# Patient Record
Sex: FEMALE | Race: WHITE | NOT HISPANIC OR LATINO | Employment: FULL TIME | URBAN - METROPOLITAN AREA
[De-identification: names, ages, dates, MRNs, and addresses within clinical notes are randomized per-mention and may not be internally consistent; named-entity substitution may affect disease eponyms.]

---

## 2021-08-11 ENCOUNTER — OFFICE VISIT (OUTPATIENT)
Dept: ORTHOPEDIC SURGERY | Facility: CLINIC | Age: 56
End: 2021-08-11

## 2021-08-11 VITALS
DIASTOLIC BLOOD PRESSURE: 95 MMHG | WEIGHT: 264 LBS | BODY MASS INDEX: 40.01 KG/M2 | SYSTOLIC BLOOD PRESSURE: 155 MMHG | HEIGHT: 68 IN

## 2021-08-11 DIAGNOSIS — M79.672 LEFT FOOT PAIN: Primary | ICD-10-CM

## 2021-08-11 DIAGNOSIS — E66.01 OBESITY, MORBID, BMI 40.0-49.9 (HCC): ICD-10-CM

## 2021-08-11 DIAGNOSIS — G62.9 NEUROPATHY: ICD-10-CM

## 2021-08-11 DIAGNOSIS — F17.200 SMOKING: ICD-10-CM

## 2021-08-11 DIAGNOSIS — L92.0 GRANULOMA ANNULARE: ICD-10-CM

## 2021-08-11 DIAGNOSIS — R29.898 WEAKNESS OF LEFT LOWER EXTREMITY: ICD-10-CM

## 2021-08-11 PROCEDURE — 99204 OFFICE O/P NEW MOD 45 MIN: CPT | Performed by: ORTHOPAEDIC SURGERY

## 2021-08-11 RX ORDER — FAMOTIDINE 20 MG/1
20 TABLET, FILM COATED ORAL NIGHTLY PRN
COMMUNITY
Start: 2021-05-10

## 2021-08-11 RX ORDER — PREGABALIN 75 MG/1
75 CAPSULE ORAL 2 TIMES DAILY
COMMUNITY
Start: 2021-07-27

## 2021-08-11 RX ORDER — IBUPROFEN 600 MG/1
600 TABLET ORAL 2 TIMES DAILY PRN
COMMUNITY
Start: 2021-06-11

## 2021-08-11 RX ORDER — TIZANIDINE 4 MG/1
4 TABLET ORAL 3 TIMES DAILY PRN
COMMUNITY

## 2021-08-11 RX ORDER — AMITRIPTYLINE HYDROCHLORIDE 10 MG/1
10 TABLET, FILM COATED ORAL NIGHTLY
COMMUNITY
Start: 2021-06-11

## 2021-08-11 RX ORDER — AMLODIPINE BESYLATE 10 MG/1
10 TABLET ORAL EVERY MORNING
COMMUNITY
Start: 2021-05-22

## 2021-08-11 RX ORDER — DIPHENHYDRAMINE HCL 25 MG
25 CAPSULE ORAL NIGHTLY
COMMUNITY

## 2021-08-11 RX ORDER — LEVOTHYROXINE SODIUM 0.07 MG/1
75 TABLET ORAL DAILY
COMMUNITY

## 2021-08-11 RX ORDER — MULTIPLE VITAMINS W/ MINERALS TAB 9MG-400MCG
1 TAB ORAL DAILY
COMMUNITY

## 2021-08-11 RX ORDER — FUROSEMIDE 20 MG/1
20 TABLET ORAL AS NEEDED
COMMUNITY
Start: 2021-06-11

## 2021-08-11 RX ORDER — POTASSIUM CHLORIDE 1500 MG/1
20 TABLET, FILM COATED, EXTENDED RELEASE ORAL DAILY
COMMUNITY

## 2021-08-11 RX ORDER — LISINOPRIL 10 MG/1
10 TABLET ORAL 2 TIMES DAILY
COMMUNITY
Start: 2021-05-22

## 2021-08-11 RX ORDER — CHOLECALCIFEROL (VITAMIN D3) 1250 MCG
1 CAPSULE ORAL WEEKLY
COMMUNITY

## 2021-08-11 RX ORDER — CETIRIZINE HYDROCHLORIDE 10 MG/1
10 TABLET ORAL NIGHTLY
COMMUNITY

## 2021-08-11 RX ORDER — ACETAMINOPHEN 500 MG
1000 TABLET ORAL NIGHTLY
COMMUNITY

## 2021-08-11 NOTE — PROGRESS NOTES
"NEW PATIENT    Patient: Ciera Patton  : 1965    Primary Care Provider: Umang Headley MD    Requesting Provider: As above    Pain of the Right Ankle      History    Chief Complaint: Left leg problem    History of Present Illness: This is a pleasant 55-year-old woman here today with her mother.  She has a complicated musculoskeletal history.  In  she had an L1 burst fracture treated with surgery.  She went on to have some chronic pain and further problems and last year had an L3 to the sacrum fusion.  This was complicated by what she calls neuropathy.  She has numbness in both legs and feet.  After the initial burst fracture she had weakness in the left leg.  She has had many types of AFO braces over the years including all polyethylene, and double upright metal braces.  She reports she has had difficulty with all of them.  She has developed sores on the lateral aspect of the foot and ankle from rubbing in the brace.  She also reports that she had pain from the double upright brace.  She had nerve studies done about 2 years ago in [place], we do not have those records.  She is going to obtain those results and send them to me.  She remembers being told she had some type of neuropathy\" eventually she might not be able to walk at all\".  But again we have no documentation.  She is here with a question as to whether an ankle fusion would help stabilize her ankle.  She reports the ankle turns over all the time, she has pain, she has difficulty walking.  She rates the pain as 7-8 out of 10.  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  She also is a smoker.  I strongly recommend quitting smoking.  I explained the risks of smoking, including hardening of the arteries, gangrene, amputation.  I explained nicotine poisons bone cells and increases the risk of nonunion of fractures and fusions.  I explained that studies show that smokers have FOUR times the risk of the general population when " they have foot or ankle surgery.  I explained that I will not do elective surgery on smokers.  I explained that I test patients for nicotine a week before surgery and if they are positive surgery is canceled.  I explained that she could see any of the other orthopedic foot and ankle specialist in [facility] for another opinion, they might be willing to do it if she continues to smoke.  She reports that she will try to quit.  Her mother reports that she has quit previously.  (5min)      Current Outpatient Medications on File Prior to Visit   Medication Sig Dispense Refill   • acetaminophen (TYLENOL) 500 MG tablet Take 500 mg by mouth Every Night.     • cetirizine (zyrTEC) 10 MG tablet Take 10 mg by mouth Every Night.     • Cholecalciferol (VITAMIN D3 PO) Take  by mouth 1 (One) Time Per Week.     • diphenhydrAMINE (BENADRYL) 25 mg capsule Take 25 mg by mouth Every Night.     • levothyroxine (SYNTHROID, LEVOTHROID) 75 MCG tablet Take 75 mcg by mouth Daily. 1/2 tab extra on Sundays     • multivitamin with minerals (Womens 50+ Multi Vitamin/Min) tablet tablet Take 1 tablet by mouth Daily.     • POTASSIUM CHLORIDE ER PO Take  by mouth Daily.     • tiZANidine (ZANAFLEX) 4 MG tablet Take 4 mg by mouth 3 (Three) Times a Day.     • amitriptyline (ELAVIL) 10 MG tablet      • amLODIPine (NORVASC) 10 MG tablet Take 10 mg by mouth Daily.     • famotidine (PEPCID) 20 MG tablet      • furosemide (LASIX) 20 MG tablet      • ibuprofen (ADVIL,MOTRIN) 600 MG tablet      • lisinopril (PRINIVIL,ZESTRIL) 10 MG tablet Take 10 mg by mouth 2 (Two) Times a Day.     • pregabalin (LYRICA) 75 MG capsule        No current facility-administered medications on file prior to visit.      Allergies   Allergen Reactions   • Morphine Itching   • Statins Other (See Comments)     Rhabdomyolsis   • Flagyl [Metronidazole] Itching and Rash   • Wellbutrin [Bupropion] Itching and Rash      Past Medical History:   Diagnosis Date   • Arthritis    • Back problem    •  "Hypertension    • Hypoglycemia    • Thyroid disease     Hypo     Past Surgical History:   Procedure Laterality Date   • BACK SURGERY      fusion  L1  1993   L3 2018   •  SECTION  1997    Emergency   • COLONOSCOPY      2019   • D & C AND LAPAROSCOPY      2014   • WRIST SURGERY Left     Gangion cyst excision     Family History   Problem Relation Age of Onset   • Arthritis Mother    • Cancer Mother    • Diabetes Mother    • Diabetes Father       Social History     Socioeconomic History   • Marital status:      Spouse name: Not on file   • Number of children: Not on file   • Years of education: Not on file   • Highest education level: Not on file   Tobacco Use   • Smoking status: Current Every Day Smoker     Packs/day: 0.50     Years: 36.00     Pack years: 18.00     Types: Cigarettes   • Smokeless tobacco: Never Used   Substance and Sexual Activity   • Drug use: Never   • Sexual activity: Defer        Review of Systems   Constitutional: Positive for activity change.   HENT: Negative.    Eyes: Negative.    Respiratory: Negative.    Cardiovascular: Positive for leg swelling.   Gastrointestinal: Negative.    Endocrine: Positive for heat intolerance.   Genitourinary: Negative.    Musculoskeletal: Positive for arthralgias, back pain and joint swelling.   Skin: Negative.    Allergic/Immunologic: Positive for environmental allergies and food allergies.   Neurological: Negative.    Hematological: Negative.    Psychiatric/Behavioral: Negative.        The following portions of the patient's history were reviewed and updated as appropriate: allergies, current medications, past family history, past medical history, past social history, past surgical history and problem list.    Physical Exam:   /95   Ht 171.5 cm (67.5\")   Wt 120 kg (264 lb)   BMI 40.74 kg/m²   GENERAL: Body habitus: morbidly obese    Lower extremity edema: Right: trace; Left: trace    Varicose veins:  Right: none; Left: " none    Gait: antalgic and Moderate steppage gait on the left, she walks on the lateral border of the left foot, there is function of the anterior tib but no function of the peroneals     Mental Status:  awake and alert; oriented to person, place, and time    Voice:  clear  SKIN:  Lower extremity: Normal    Hair Growth(lower extremity):  Right:normal; Left:  normal  NAILS: Toenails: normal  HEENT: Head: Normocephalic, atraumatic,  without obvious abnormality.  eye: normal external eye, no icterus  ears:normal external ears  PULM:  Repiratory effort normal  CV:  Dorsalis Pedis:  Right: 2+; Left:2+    Posterior Tibial: Right:2+; Left:2+    Capillary Refill:  Brisk  MSK:  Hand:right handed and Granuloma annulare on both hands      Tibia:  Right:  non tender; Left:  non tender      Ankle:  Right: non tender, ROM  normal and motor function  normal; Left:  Nontender in the ankle itself, significant hindfoot varus in stance and at rest.  Cavovarus of the foot.  The Achilles is tight, she has -10 degrees of plantarflexion with the knee flexed and extended, the hindfoot varus is almost correctable to neutral.  She has a thickened callus and bursa on the fifth metatarsal base laterally because she is walking on the lateral border of the foot.  Moderately tender to palpation.  No open wounds.      Foot:  Right:  non tender; Left:  See ankle exam      NEURO: Heel Walking:  Right:  unable to test; Left:  unable to test    Toe Walking:  Right:  unable to test; Left:  unable to test     Fremont-Tavon 5.07 monofilament test: No sensation to the [place] Tavon fiber on the left foot, patchy on the right both dorsal and plantar    Lower extremity sensation: diminished     Reflexes:  Biceps:  Right:  not tested; Left:  not tested           Quads:  Right:  not tested; Left:  not tested           Ankle:  Right:  not tested; Left:  not tested      Calf Atrophy:Moderate left calf atrophy    Motor Function: Right lower extremity motor  function is 5 out of 5, no spasticity that I can identify.  Left lower extremity I do not find any obvious spasticity, no clonus, the anterior tib is 5 out of 5, posterior tib 5 out of 5, EHL 4 out of 5, EDL 4 out of 5, gastroc 5 out of 5, peroneals 0 out of 5, FHL 5 out of 5         Medical Decision Making    Data Review:   ordered and reviewed x-rays today, I reviewed records from her internal medicine physician.  We are going to obtain the prior nerve studies.    Assessment and Plan/ Diagnosis/Treatment options:   1. Left foot pain  Weakness in the left lower extremity especially the peroneals and EHL EDL.  We talked about the options.  I think an ankle fusion would be too aggressive.  Her ankle cartilage is preserved on the x-ray and the ankle does not have any tilt in the mortise.  Even with an ankle fusion she would still need bracing for the varus hindfoot.  Although she did not bring her braces, by description she has tried many different types of AFOs including a double upright.  For surgery I think it would be reasonable to try osteotomies and tendon transfers.  I explained that this would absolutely not make her normal, and she would still need a brace, but with the foot more centered under the tibia and in better alignment, the brace wear should be much improved.  If it failed then a tibial talocalcaneal fusion might be possible.  If it came to surgery I would do a posterior tibial tendon transfer through the interosseous membrane to the lateral foot, I would do a peroneus longus to brevis tenodesis, and Achilles tendon lengthening, possibly toe tendon lengthenings if needed.  I would do a dorsiflexion osteotomy of the first metatarsal.  I would do a sliding osteotomy of the calcaneus.  This is major surgery with a long recovery.  As noted above I am not willing to do it unless she quit smoking.  Before we make any type of surgical decision we need to repeat the EMG/NCV's.  I would like Dr. Houston Diehl  to do this.  I would also like her to bring the previous studies for comparison.  She is going to think about her options and work on quitting smoking.  We will order the nerve test.  As above I also gave her the names of several other orthopedic foot and ankle surgeons in the area.  - XR Ankle 2 View Left  - XR Foot 3+ View Left    2. Weakness of left lower extremity  As above  - EMG & Nerve Conduction Test; Future    3. Neuropathy  Fairly dense sensory component to her neurologic problem    4. Smoking  As above she is going to work on quitting    5. Granuloma annulare  Present on both hands    6. Obesity, morbid, BMI 40.0-49.9 (CMS/[facility])  BMI adds complications risk, her BMI is 40.74      Addendum 8/18/2021: We were able to obtain the EMGs/NCV's dated 2/20/2018. I reviewed them. They are abnormal on the left but nonspecific. They indicate abnormal peroneal and posterior tibial latencies, the diagnosis of the physician that did them was that she had RSD. This does not make sense in the face of the foot drop. Their note indicates they did not see a foot drop but clearly she has significant weakness in motor groups. I do not think that she has RSD. We will proceed with the repeat EMG/NCV's for surgical planning.      Radiology Ordered []  Radiology Reports Reviewed []      Radiology Images Reviewed []   Labs Reviewed []    Labs Ordered []   PCP Records Reviewed []    Provider Records Reviewed []    ER Records Reviewed []    Hospital Records Reviewed []    History Obtained From Family []    Phone conversation with Provider []    Records Requested []        Keesha Degroot MD

## 2021-09-17 ENCOUNTER — HOSPITAL ENCOUNTER (OUTPATIENT)
Dept: NEUROLOGY | Facility: HOSPITAL | Age: 56
Discharge: HOME | End: 2021-09-17
Admitting: ORTHOPAEDIC SURGERY

## 2021-09-17 DIAGNOSIS — R29.898 WEAKNESS OF LEFT LOWER EXTREMITY: ICD-10-CM

## 2021-09-17 PROCEDURE — 95910 NRV CNDJ TEST 7-8 STUDIES: CPT

## 2021-09-17 PROCEDURE — 95886 MUSC TEST DONE W/N TEST COMP: CPT

## 2021-09-22 ENCOUNTER — OFFICE VISIT (OUTPATIENT)
Dept: ORTHOPEDIC SURGERY | Facility: CLINIC | Age: 56
End: 2021-09-22

## 2021-09-22 VITALS
WEIGHT: 270 LBS | DIASTOLIC BLOOD PRESSURE: 91 MMHG | HEART RATE: 90 BPM | BODY MASS INDEX: 40.92 KG/M2 | SYSTOLIC BLOOD PRESSURE: 183 MMHG | HEIGHT: 68 IN

## 2021-09-22 DIAGNOSIS — R29.898 WEAKNESS OF LEFT LOWER EXTREMITY: Primary | ICD-10-CM

## 2021-09-22 DIAGNOSIS — E66.01 OBESITY, MORBID, BMI 40.0-49.9 (HCC): ICD-10-CM

## 2021-09-22 DIAGNOSIS — F17.200 SMOKING: ICD-10-CM

## 2021-09-22 DIAGNOSIS — L92.0 GRANULOMA ANNULARE: ICD-10-CM

## 2021-09-22 DIAGNOSIS — G62.9 NEUROPATHY: ICD-10-CM

## 2021-09-22 PROCEDURE — 99214 OFFICE O/P EST MOD 30 MIN: CPT | Performed by: ORTHOPAEDIC SURGERY

## 2021-09-22 NOTE — PROGRESS NOTES
ESTABLISHED PATIENT    Patient: Ciera Patton  : 1965    Primary Care Provider: Umang Headley MD    Requesting Provider: As above    Follow-up (EMG bilateral lower extremity 21)      History    Chief Complaint: Left leg weakness    History of Present Illness: She returns for follow-up of the EMGs.  They were done by Dr. Diehl on 2021.  They show the weakness in the L5-S1 nerve root distribution as I suspected.  Its more peroneal and posterior tibial.  She has quit smoking that is excellent.    Current Outpatient Medications on File Prior to Visit   Medication Sig Dispense Refill   • acetaminophen (TYLENOL) 500 MG tablet Take 500 mg by mouth Every Night.     • amitriptyline (ELAVIL) 10 MG tablet      • amLODIPine (NORVASC) 10 MG tablet Take 10 mg by mouth Daily.     • cetirizine (zyrTEC) 10 MG tablet Take 10 mg by mouth Every Night.     • Cholecalciferol (VITAMIN D3 PO) Take  by mouth 1 (One) Time Per Week.     • diphenhydrAMINE (BENADRYL) 25 mg capsule Take 25 mg by mouth Every Night.     • famotidine (PEPCID) 20 MG tablet      • furosemide (LASIX) 20 MG tablet      • ibuprofen (ADVIL,MOTRIN) 600 MG tablet      • levothyroxine (SYNTHROID, LEVOTHROID) 75 MCG tablet Take 75 mcg by mouth Daily. 1/2 tab extra on Sundays     • lisinopril (PRINIVIL,ZESTRIL) 10 MG tablet Take 10 mg by mouth 2 (Two) Times a Day.     • multivitamin with minerals (Womens 50+ Multi Vitamin/Min) tablet tablet Take 1 tablet by mouth Daily.     • POTASSIUM CHLORIDE ER PO Take  by mouth Daily.     • pregabalin (LYRICA) 75 MG capsule      • tiZANidine (ZANAFLEX) 4 MG tablet Take 4 mg by mouth 3 (Three) Times a Day.       No current facility-administered medications on file prior to visit.      Allergies   Allergen Reactions   • Morphine Itching   • Statins Other (See Comments)     Rhabdomyolsis   • Flagyl [Metronidazole] Itching and Rash   • Wellbutrin [Bupropion] Itching and Rash      Past Medical History:   Diagnosis  "Date   • Arthritis    • Back problem    • Hypertension    • Hypoglycemia    • Thyroid disease     Hypo     Past Surgical History:   Procedure Laterality Date   • BACK SURGERY      fusion  L1  1993   L3 2018   •  SECTION  1997    Emergency   • COLONOSCOPY         • D & C AND LAPAROSCOPY      2014   • WRIST SURGERY Left     Gangion cyst excision     Family History   Problem Relation Age of Onset   • Arthritis Mother    • Cancer Mother    • Diabetes Mother    • Diabetes Father       Social History     Socioeconomic History   • Marital status:      Spouse name: Not on file   • Number of children: Not on file   • Years of education: Not on file   • Highest education level: Not on file   Tobacco Use   • Smoking status: Current Every Day Smoker     Packs/day: 0.50     Years: 36.00     Pack years: 18.00     Types: Cigarettes   • Smokeless tobacco: Never Used   Substance and Sexual Activity   • Drug use: Never   • Sexual activity: Defer        Review of Systems   Constitutional: Negative.    HENT: Negative.    Eyes: Negative.    Respiratory: Negative.    Cardiovascular: Negative.    Gastrointestinal: Negative.    Endocrine: Negative.    Genitourinary: Negative.    Musculoskeletal: Positive for arthralgias.   Skin: Negative.    Allergic/Immunologic: Negative.    Neurological: Negative.    Hematological: Negative.    Psychiatric/Behavioral: Negative.        The following portions of the patient's history were reviewed and updated as appropriate: allergies, current medications, past family history, past medical history, past social history, past surgical history and problem list.    Physical Exam:   Ht 171.5 cm (67.53\")   Wt 122 kg (270 lb)   BMI 41.63 kg/m²   GENERAL: Gait: antalgic       MSK:  No change in the cavovarus left foot, no change in the weakness    Medical Decision Making    Data Review:   reviewed prior lab results    Assessment/Plan/Diagnosis/Treatment Options:   1. Weakness of left " lower extremity  She returns for follow-up of the EMGs.  I reviewed them and the report.  They show the weakness as I expected.  She is interested in pursuing surgery.  We discussed the pros and cons.  I explained why I would not start with a fusion.  What I would do is 1.  Calcaneal osteotomy 2.  First metatarsal osteotomy 3.  Posterior tibial tendon transfer 4.  Peroneus longus tenodesis to the brevis.5.  Possible toe tendon lengthening.6.  Achilles lengthening.  I explained the rationale for all of these different things.  I explained I absolutely cannot make her normal.  The goal is to get the foot straighter so that it is more comfortable in a brace.  I explained the recovery.  She will be 8 to 10 weeks nonweightbearing in a cast, then a walking boot 8 to 10 weeks.  Then we will go back to a brace.  I explained it takes a year to see the end result.  I explained how all foot and ankle surgery swells longer than any other surgery.  We discussed the risks including but not limited to: death, infection, neurovascular damage, strokes, heart attacks, blood clots, chronic pain, deformity, stiffness, malunion, nonunion, hardware failure, need for further surgery,  amputation, etc.  Questions were asked and answered in detail.  We also discussed what would happen if we do nothing.       2. Neuropathy  No change    3. Obesity, morbid, BMI 40.0-49.9 (HCC)  Weight loss would be helpful, helps with decrease pain, BMI 41.63    4. Smoking  She has quit, she understands we will do nicotine test preop    5. Granuloma annulare  No change

## 2021-10-06 PROBLEM — G62.9 NEUROPATHY: Status: ACTIVE | Noted: 2021-10-06

## 2021-10-21 ENCOUNTER — APPOINTMENT (OUTPATIENT)
Dept: PREADMISSION TESTING | Facility: HOSPITAL | Age: 56
End: 2021-10-21

## 2021-10-27 ENCOUNTER — PRE-ADMISSION TESTING (OUTPATIENT)
Dept: PREADMISSION TESTING | Facility: HOSPITAL | Age: 56
End: 2021-10-27

## 2021-10-27 VITALS — WEIGHT: 279.2 LBS | HEIGHT: 69 IN | BODY MASS INDEX: 41.35 KG/M2

## 2021-10-27 DIAGNOSIS — G62.9 NEUROPATHY: ICD-10-CM

## 2021-10-27 DIAGNOSIS — R29.898 WEAKNESS OF LEFT LOWER EXTREMITY: ICD-10-CM

## 2021-10-27 LAB
ANION GAP SERPL CALCULATED.3IONS-SCNC: 13 MMOL/L (ref 5–15)
BASOPHILS # BLD AUTO: 0.04 10*3/MM3 (ref 0–0.2)
BASOPHILS NFR BLD AUTO: 0.5 % (ref 0–1.5)
BUN SERPL-MCNC: 21 MG/DL (ref 6–20)
BUN/CREAT SERPL: 20.8 (ref 7–25)
CALCIUM SPEC-SCNC: 10.1 MG/DL (ref 8.6–10.5)
CHLORIDE SERPL-SCNC: 104 MMOL/L (ref 98–107)
CO2 SERPL-SCNC: 23 MMOL/L (ref 22–29)
CREAT SERPL-MCNC: 1.01 MG/DL (ref 0.57–1)
DEPRECATED RDW RBC AUTO: 55.2 FL (ref 37–54)
EOSINOPHIL # BLD AUTO: 0.18 10*3/MM3 (ref 0–0.4)
EOSINOPHIL NFR BLD AUTO: 2.4 % (ref 0.3–6.2)
ERYTHROCYTE [DISTWIDTH] IN BLOOD BY AUTOMATED COUNT: 14.1 % (ref 12.3–15.4)
GFR SERPL CREATININE-BSD FRML MDRD: 57 ML/MIN/1.73
GLUCOSE SERPL-MCNC: 97 MG/DL (ref 65–99)
HBA1C MFR BLD: 4.9 % (ref 4.8–5.6)
HCT VFR BLD AUTO: 39.7 % (ref 34–46.6)
HGB BLD-MCNC: 12.9 G/DL (ref 12–15.9)
IMM GRANULOCYTES # BLD AUTO: 0.03 10*3/MM3 (ref 0–0.05)
IMM GRANULOCYTES NFR BLD AUTO: 0.4 % (ref 0–0.5)
LYMPHOCYTES # BLD AUTO: 1.82 10*3/MM3 (ref 0.7–3.1)
LYMPHOCYTES NFR BLD AUTO: 24.7 % (ref 19.6–45.3)
MCH RBC QN AUTO: 34 PG (ref 26.6–33)
MCHC RBC AUTO-ENTMCNC: 32.5 G/DL (ref 31.5–35.7)
MCV RBC AUTO: 104.7 FL (ref 79–97)
MONOCYTES # BLD AUTO: 0.32 10*3/MM3 (ref 0.1–0.9)
MONOCYTES NFR BLD AUTO: 4.3 % (ref 5–12)
NEUTROPHILS NFR BLD AUTO: 4.97 10*3/MM3 (ref 1.7–7)
NEUTROPHILS NFR BLD AUTO: 67.7 % (ref 42.7–76)
NRBC BLD AUTO-RTO: 0 /100 WBC (ref 0–0.2)
PLATELET # BLD AUTO: 267 10*3/MM3 (ref 140–450)
PMV BLD AUTO: 10.3 FL (ref 6–12)
POTASSIUM SERPL-SCNC: 4.9 MMOL/L (ref 3.5–5.2)
QT INTERVAL: 354 MS
QTC INTERVAL: 433 MS
RBC # BLD AUTO: 3.79 10*6/MM3 (ref 3.77–5.28)
SODIUM SERPL-SCNC: 140 MMOL/L (ref 136–145)
WBC # BLD AUTO: 7.36 10*3/MM3 (ref 3.4–10.8)

## 2021-10-27 PROCEDURE — 85025 COMPLETE CBC W/AUTO DIFF WBC: CPT

## 2021-10-27 PROCEDURE — G0480 DRUG TEST DEF 1-7 CLASSES: HCPCS

## 2021-10-27 PROCEDURE — 93010 ELECTROCARDIOGRAM REPORT: CPT | Performed by: INTERNAL MEDICINE

## 2021-10-27 PROCEDURE — 83036 HEMOGLOBIN GLYCOSYLATED A1C: CPT

## 2021-10-27 PROCEDURE — 93005 ELECTROCARDIOGRAM TRACING: CPT

## 2021-10-27 PROCEDURE — 36415 COLL VENOUS BLD VENIPUNCTURE: CPT

## 2021-10-27 PROCEDURE — 80048 BASIC METABOLIC PNL TOTAL CA: CPT

## 2021-10-31 ENCOUNTER — APPOINTMENT (OUTPATIENT)
Dept: PREADMISSION TESTING | Facility: HOSPITAL | Age: 56
End: 2021-10-31

## 2021-10-31 LAB — SARS-COV-2 RNA PNL SPEC NAA+PROBE: NOT DETECTED

## 2021-10-31 PROCEDURE — U0004 COV-19 TEST NON-CDC HGH THRU: HCPCS | Performed by: ORTHOPAEDIC SURGERY

## 2021-11-01 ENCOUNTER — ANESTHESIA EVENT (OUTPATIENT)
Dept: PERIOP | Facility: HOSPITAL | Age: 56
End: 2021-11-01

## 2021-11-01 RX ORDER — FAMOTIDINE 10 MG/ML
20 INJECTION, SOLUTION INTRAVENOUS ONCE
Status: CANCELLED | OUTPATIENT
Start: 2021-11-01 | End: 2021-11-01

## 2021-11-02 ENCOUNTER — ANESTHESIA (OUTPATIENT)
Dept: PERIOP | Facility: HOSPITAL | Age: 56
End: 2021-11-02

## 2021-11-02 ENCOUNTER — HOSPITAL ENCOUNTER (OUTPATIENT)
Facility: HOSPITAL | Age: 56
Discharge: HOME | End: 2021-11-03
Attending: ORTHOPAEDIC SURGERY | Admitting: ORTHOPAEDIC SURGERY

## 2021-11-02 ENCOUNTER — ANESTHESIA EVENT CONVERTED (OUTPATIENT)
Dept: ANESTHESIOLOGY | Facility: HOSPITAL | Age: 56
End: 2021-11-02

## 2021-11-02 ENCOUNTER — APPOINTMENT (OUTPATIENT)
Dept: GENERAL RADIOLOGY | Facility: HOSPITAL | Age: 56
End: 2021-11-02

## 2021-11-02 DIAGNOSIS — R29.898 WEAKNESS OF LEFT LOWER EXTREMITY: ICD-10-CM

## 2021-11-02 DIAGNOSIS — G62.9 NEUROPATHY: ICD-10-CM

## 2021-11-02 DIAGNOSIS — G89.18 ACUTE POSTOPERATIVE PAIN: Primary | ICD-10-CM

## 2021-11-02 DIAGNOSIS — Z98.890 S/P FOOT SURGERY, LEFT: ICD-10-CM

## 2021-11-02 DIAGNOSIS — Z98.890 STATUS POST LEFT FOOT SURGERY: ICD-10-CM

## 2021-11-02 PROBLEM — I10 HTN (HYPERTENSION): Status: ACTIVE | Noted: 2021-11-02

## 2021-11-02 PROBLEM — E66.9 OBESITY: Status: ACTIVE | Noted: 2021-11-02

## 2021-11-02 PROBLEM — K21.9 GERD (GASTROESOPHAGEAL REFLUX DISEASE): Status: ACTIVE | Noted: 2021-11-02

## 2021-11-02 LAB
COTININE SERPL-MCNC: <1 NG/ML
GLUCOSE BLDC GLUCOMTR-MCNC: 92 MG/DL (ref 70–130)
HCT VFR BLD AUTO: 38.3 % (ref 34–46.6)
HGB BLD-MCNC: 12.1 G/DL (ref 12–15.9)
NICOTINE SERPL-MCNC: <1 NG/ML

## 2021-11-02 PROCEDURE — C1713 ANCHOR/SCREW BN/BN,TIS/BN: HCPCS | Performed by: ORTHOPAEDIC SURGERY

## 2021-11-02 PROCEDURE — 25010000002 HYDROMORPHONE 1 MG/ML SOLUTION: Performed by: NURSE ANESTHETIST, CERTIFIED REGISTERED

## 2021-11-02 PROCEDURE — 25010000002 DEXAMETHASONE PER 1 MG: Performed by: NURSE ANESTHETIST, CERTIFIED REGISTERED

## 2021-11-02 PROCEDURE — 25010000002 ONDANSETRON PER 1 MG: Performed by: NURSE ANESTHETIST, CERTIFIED REGISTERED

## 2021-11-02 PROCEDURE — 27685 REVISION OF LOWER LEG TENDON: CPT | Performed by: ORTHOPAEDIC SURGERY

## 2021-11-02 PROCEDURE — 25010000002 DEXAMETHASONE SODIUM PHOSPHATE 10 MG/ML SOLUTION: Performed by: NURSE ANESTHETIST, CERTIFIED REGISTERED

## 2021-11-02 PROCEDURE — 85014 HEMATOCRIT: CPT | Performed by: ORTHOPAEDIC SURGERY

## 2021-11-02 PROCEDURE — 28306 INCISION OF METATARSAL: CPT | Performed by: ORTHOPAEDIC SURGERY

## 2021-11-02 PROCEDURE — 25010000002 HYDROMORPHONE PER 4 MG: Performed by: ANESTHESIOLOGY

## 2021-11-02 PROCEDURE — 25010000002 MIDAZOLAM PER 1 MG: Performed by: NURSE ANESTHETIST, CERTIFIED REGISTERED

## 2021-11-02 PROCEDURE — 0 LIDOCAINE 1 % SOLUTION: Performed by: NURSE ANESTHETIST, CERTIFIED REGISTERED

## 2021-11-02 PROCEDURE — 25010000002 PROPOFOL 10 MG/ML EMULSION: Performed by: NURSE ANESTHETIST, CERTIFIED REGISTERED

## 2021-11-02 PROCEDURE — 25010000002 NEOSTIGMINE 10 MG/10ML SOLUTION: Performed by: NURSE ANESTHETIST, CERTIFIED REGISTERED

## 2021-11-02 PROCEDURE — 28300 INCISION OF HEEL BONE: CPT | Performed by: ORTHOPAEDIC SURGERY

## 2021-11-02 PROCEDURE — 85018 HEMOGLOBIN: CPT | Performed by: ORTHOPAEDIC SURGERY

## 2021-11-02 PROCEDURE — 63710000001 DIPHENHYDRAMINE PER 50 MG: Performed by: INTERNAL MEDICINE

## 2021-11-02 PROCEDURE — S0260 H&P FOR SURGERY: HCPCS | Performed by: ORTHOPAEDIC SURGERY

## 2021-11-02 PROCEDURE — C1889 IMPLANT/INSERT DEVICE, NOC: HCPCS | Performed by: ORTHOPAEDIC SURGERY

## 2021-11-02 PROCEDURE — 97161 PT EVAL LOW COMPLEX 20 MIN: CPT

## 2021-11-02 PROCEDURE — 27691 REVISE LOWER LEG TENDON: CPT | Performed by: ORTHOPAEDIC SURGERY

## 2021-11-02 PROCEDURE — 25010000002 ROPIVACINE HCL-NACL: Performed by: NURSE ANESTHETIST, CERTIFIED REGISTERED

## 2021-11-02 PROCEDURE — 28232 INCISION OF TOE TENDON: CPT | Performed by: ORTHOPAEDIC SURGERY

## 2021-11-02 PROCEDURE — 97530 THERAPEUTIC ACTIVITIES: CPT

## 2021-11-02 PROCEDURE — C1769 GUIDE WIRE: HCPCS | Performed by: ORTHOPAEDIC SURGERY

## 2021-11-02 PROCEDURE — 82962 GLUCOSE BLOOD TEST: CPT

## 2021-11-02 PROCEDURE — 25010000002 CEFAZOLIN PER 500 MG: Performed by: ORTHOPAEDIC SURGERY

## 2021-11-02 PROCEDURE — 76000 FLUOROSCOPY <1 HR PHYS/QHP: CPT

## 2021-11-02 PROCEDURE — 25010000002 FENTANYL CITRATE (PF) 50 MCG/ML SOLUTION: Performed by: NURSE ANESTHETIST, CERTIFIED REGISTERED

## 2021-11-02 DEVICE — BABY GORILLA, SCREW, Ø2.5 X 20MM, NON-LOCKING, TIA
Type: IMPLANTABLE DEVICE | Site: FOOT | Status: FUNCTIONAL
Brand: BABY GORILLA/GORILLA PLATING SYSTEM

## 2021-11-02 DEVICE — IMPLANTABLE DEVICE: Type: IMPLANTABLE DEVICE | Site: FOOT | Status: FUNCTIONAL

## 2021-11-02 DEVICE — HEALIX TI ANCHOR W/ ORTHOCORD TITANIUM ANCHOR (1) VIOLET (1) BLUE STRAND, SIZE 2 (5 METRIC) ORTHOCORD BRAIDED COMPOSITE SUTURE, 36 INCHES (91CM) 4.5MM
Type: IMPLANTABLE DEVICE | Site: FOOT | Status: FUNCTIONAL
Brand: ORTHOCORD HEALIX TI

## 2021-11-02 RX ORDER — MIDAZOLAM HYDROCHLORIDE 1 MG/ML
INJECTION INTRAMUSCULAR; INTRAVENOUS
Status: DISPENSED
Start: 2021-11-02 | End: 2021-11-03

## 2021-11-02 RX ORDER — FAMOTIDINE 20 MG/1
20 TABLET, FILM COATED ORAL NIGHTLY PRN
Status: DISCONTINUED | OUTPATIENT
Start: 2021-11-02 | End: 2021-11-03 | Stop reason: HOSPADM

## 2021-11-02 RX ORDER — DEXTROSE, SODIUM CHLORIDE, AND POTASSIUM CHLORIDE 5; .45; .15 G/100ML; G/100ML; G/100ML
75 INJECTION INTRAVENOUS CONTINUOUS
Status: DISCONTINUED | OUTPATIENT
Start: 2021-11-02 | End: 2021-11-03 | Stop reason: HOSPADM

## 2021-11-02 RX ORDER — PROMETHAZINE HYDROCHLORIDE 12.5 MG/1
12.5 SUPPOSITORY RECTAL EVERY 4 HOURS PRN
Status: DISCONTINUED | OUTPATIENT
Start: 2021-11-02 | End: 2021-11-03 | Stop reason: HOSPADM

## 2021-11-02 RX ORDER — FAMOTIDINE 20 MG/1
20 TABLET, FILM COATED ORAL ONCE
Status: COMPLETED | OUTPATIENT
Start: 2021-11-02 | End: 2021-11-02

## 2021-11-02 RX ORDER — PROMETHAZINE HYDROCHLORIDE 12.5 MG/1
12.5 TABLET ORAL EVERY 4 HOURS PRN
Status: DISCONTINUED | OUTPATIENT
Start: 2021-11-02 | End: 2021-11-03 | Stop reason: HOSPADM

## 2021-11-02 RX ORDER — DIPHENHYDRAMINE HCL 25 MG
25 CAPSULE ORAL NIGHTLY PRN
Status: DISCONTINUED | OUTPATIENT
Start: 2021-11-02 | End: 2021-11-03 | Stop reason: HOSPADM

## 2021-11-02 RX ORDER — BISACODYL 10 MG
10 SUPPOSITORY, RECTAL RECTAL DAILY PRN
Status: DISCONTINUED | OUTPATIENT
Start: 2021-11-02 | End: 2021-11-03 | Stop reason: HOSPADM

## 2021-11-02 RX ORDER — ROCURONIUM BROMIDE 10 MG/ML
INJECTION, SOLUTION INTRAVENOUS AS NEEDED
Status: DISCONTINUED | OUTPATIENT
Start: 2021-11-02 | End: 2021-11-02 | Stop reason: SURG

## 2021-11-02 RX ORDER — AMITRIPTYLINE HYDROCHLORIDE 10 MG/1
10 TABLET, FILM COATED ORAL NIGHTLY
Status: DISCONTINUED | OUTPATIENT
Start: 2021-11-02 | End: 2021-11-03 | Stop reason: HOSPADM

## 2021-11-02 RX ORDER — DIPHENHYDRAMINE HCL 25 MG
25 CAPSULE ORAL NIGHTLY
Status: DISCONTINUED | OUTPATIENT
Start: 2021-11-02 | End: 2021-11-03 | Stop reason: HOSPADM

## 2021-11-02 RX ORDER — HYDROMORPHONE HYDROCHLORIDE 1 MG/ML
0.5 INJECTION, SOLUTION INTRAMUSCULAR; INTRAVENOUS; SUBCUTANEOUS
Status: DISCONTINUED | OUTPATIENT
Start: 2021-11-02 | End: 2021-11-02 | Stop reason: HOSPADM

## 2021-11-02 RX ORDER — SODIUM CHLORIDE, SODIUM LACTATE, POTASSIUM CHLORIDE, CALCIUM CHLORIDE 600; 310; 30; 20 MG/100ML; MG/100ML; MG/100ML; MG/100ML
9 INJECTION, SOLUTION INTRAVENOUS CONTINUOUS
Status: DISCONTINUED | OUTPATIENT
Start: 2021-11-02 | End: 2021-11-03 | Stop reason: HOSPADM

## 2021-11-02 RX ORDER — DIPHENOXYLATE HYDROCHLORIDE AND ATROPINE SULFATE 2.5; .025 MG/1; MG/1
1 TABLET ORAL DAILY
Status: DISCONTINUED | OUTPATIENT
Start: 2021-11-02 | End: 2021-11-03 | Stop reason: HOSPADM

## 2021-11-02 RX ORDER — CETIRIZINE HYDROCHLORIDE 10 MG/1
10 TABLET ORAL NIGHTLY
Status: DISCONTINUED | OUTPATIENT
Start: 2021-11-02 | End: 2021-11-03 | Stop reason: HOSPADM

## 2021-11-02 RX ORDER — LIDOCAINE HYDROCHLORIDE 10 MG/ML
0.5 INJECTION, SOLUTION EPIDURAL; INFILTRATION; INTRACAUDAL; PERINEURAL ONCE AS NEEDED
Status: COMPLETED | OUTPATIENT
Start: 2021-11-02 | End: 2021-11-02

## 2021-11-02 RX ORDER — FENTANYL CITRATE 50 UG/ML
INJECTION, SOLUTION INTRAMUSCULAR; INTRAVENOUS
Status: COMPLETED | OUTPATIENT
Start: 2021-11-02 | End: 2021-11-02

## 2021-11-02 RX ORDER — KETAMINE HCL IN NACL, ISO-OSM 100MG/10ML
SYRINGE (ML) INJECTION AS NEEDED
Status: DISCONTINUED | OUTPATIENT
Start: 2021-11-02 | End: 2021-11-02 | Stop reason: SURG

## 2021-11-02 RX ORDER — HYDROCODONE BITARTRATE AND ACETAMINOPHEN 7.5; 325 MG/1; MG/1
2 TABLET ORAL EVERY 4 HOURS PRN
Status: DISCONTINUED | OUTPATIENT
Start: 2021-11-02 | End: 2021-11-03 | Stop reason: HOSPADM

## 2021-11-02 RX ORDER — DEXAMETHASONE SODIUM PHOSPHATE 10 MG/ML
INJECTION, SOLUTION INTRAMUSCULAR; INTRAVENOUS
Status: COMPLETED | OUTPATIENT
Start: 2021-11-02 | End: 2021-11-02

## 2021-11-02 RX ORDER — SODIUM CHLORIDE 0.9 % (FLUSH) 0.9 %
10 SYRINGE (ML) INJECTION AS NEEDED
Status: DISCONTINUED | OUTPATIENT
Start: 2021-11-02 | End: 2021-11-02 | Stop reason: HOSPADM

## 2021-11-02 RX ORDER — PROPOFOL 10 MG/ML
VIAL (ML) INTRAVENOUS AS NEEDED
Status: DISCONTINUED | OUTPATIENT
Start: 2021-11-02 | End: 2021-11-02 | Stop reason: SURG

## 2021-11-02 RX ORDER — DEXAMETHASONE SODIUM PHOSPHATE 4 MG/ML
INJECTION, SOLUTION INTRA-ARTICULAR; INTRALESIONAL; INTRAMUSCULAR; INTRAVENOUS; SOFT TISSUE AS NEEDED
Status: DISCONTINUED | OUTPATIENT
Start: 2021-11-02 | End: 2021-11-02 | Stop reason: SURG

## 2021-11-02 RX ORDER — PREGABALIN 75 MG/1
75 CAPSULE ORAL ONCE
Status: COMPLETED | OUTPATIENT
Start: 2021-11-02 | End: 2021-11-02

## 2021-11-02 RX ORDER — LEVOTHYROXINE SODIUM 0.07 MG/1
75 TABLET ORAL DAILY
Status: DISCONTINUED | OUTPATIENT
Start: 2021-11-03 | End: 2021-11-03 | Stop reason: HOSPADM

## 2021-11-02 RX ORDER — LISINOPRIL 10 MG/1
10 TABLET ORAL 2 TIMES DAILY
Status: DISCONTINUED | OUTPATIENT
Start: 2021-11-02 | End: 2021-11-03 | Stop reason: HOSPADM

## 2021-11-02 RX ORDER — PREGABALIN 75 MG/1
75 CAPSULE ORAL 2 TIMES DAILY
Status: DISCONTINUED | OUTPATIENT
Start: 2021-11-02 | End: 2021-11-03 | Stop reason: HOSPADM

## 2021-11-02 RX ORDER — LIDOCAINE HYDROCHLORIDE 10 MG/ML
INJECTION, SOLUTION INFILTRATION; PERINEURAL AS NEEDED
Status: DISCONTINUED | OUTPATIENT
Start: 2021-11-02 | End: 2021-11-02 | Stop reason: SURG

## 2021-11-02 RX ORDER — NEOSTIGMINE METHYLSULFATE 1 MG/ML
INJECTION, SOLUTION INTRAVENOUS AS NEEDED
Status: DISCONTINUED | OUTPATIENT
Start: 2021-11-02 | End: 2021-11-02 | Stop reason: SURG

## 2021-11-02 RX ORDER — MIDAZOLAM HYDROCHLORIDE 1 MG/ML
1 INJECTION INTRAMUSCULAR; INTRAVENOUS
Status: DISCONTINUED | OUTPATIENT
Start: 2021-11-02 | End: 2021-11-02 | Stop reason: HOSPADM

## 2021-11-02 RX ORDER — ESMOLOL HYDROCHLORIDE 10 MG/ML
INJECTION INTRAVENOUS AS NEEDED
Status: DISCONTINUED | OUTPATIENT
Start: 2021-11-02 | End: 2021-11-02 | Stop reason: SURG

## 2021-11-02 RX ORDER — TIZANIDINE 4 MG/1
4 TABLET ORAL EVERY 8 HOURS PRN
Status: DISCONTINUED | OUTPATIENT
Start: 2021-11-02 | End: 2021-11-02 | Stop reason: HOSPADM

## 2021-11-02 RX ORDER — GLYCOPYRROLATE 0.2 MG/ML
INJECTION INTRAMUSCULAR; INTRAVENOUS AS NEEDED
Status: DISCONTINUED | OUTPATIENT
Start: 2021-11-02 | End: 2021-11-02 | Stop reason: SURG

## 2021-11-02 RX ORDER — TIZANIDINE 4 MG/1
4 TABLET ORAL 3 TIMES DAILY PRN
Status: DISCONTINUED | OUTPATIENT
Start: 2021-11-02 | End: 2021-11-03 | Stop reason: HOSPADM

## 2021-11-02 RX ORDER — ONDANSETRON 4 MG/1
4 TABLET, FILM COATED ORAL EVERY 6 HOURS PRN
Qty: 30 TABLET | Refills: 0 | Status: SHIPPED | OUTPATIENT
Start: 2021-11-02 | End: 2022-01-12

## 2021-11-02 RX ORDER — CEFAZOLIN SODIUM IN 0.9 % NACL 3 G/100 ML
3 INTRAVENOUS SOLUTION, PIGGYBACK (ML) INTRAVENOUS ONCE
Status: COMPLETED | OUTPATIENT
Start: 2021-11-02 | End: 2021-11-02

## 2021-11-02 RX ORDER — HYDROCODONE BITARTRATE AND ACETAMINOPHEN 7.5; 325 MG/1; MG/1
1 TABLET ORAL EVERY 4 HOURS PRN
Status: DISCONTINUED | OUTPATIENT
Start: 2021-11-02 | End: 2021-11-03 | Stop reason: HOSPADM

## 2021-11-02 RX ORDER — ONDANSETRON 2 MG/ML
4 INJECTION INTRAMUSCULAR; INTRAVENOUS EVERY 6 HOURS PRN
Status: DISCONTINUED | OUTPATIENT
Start: 2021-11-02 | End: 2021-11-03 | Stop reason: HOSPADM

## 2021-11-02 RX ORDER — ONDANSETRON 2 MG/ML
INJECTION INTRAMUSCULAR; INTRAVENOUS AS NEEDED
Status: DISCONTINUED | OUTPATIENT
Start: 2021-11-02 | End: 2021-11-02 | Stop reason: SURG

## 2021-11-02 RX ORDER — OXYCODONE HYDROCHLORIDE AND ACETAMINOPHEN 5; 325 MG/1; MG/1
1-2 TABLET ORAL EVERY 6 HOURS PRN
Qty: 45 TABLET | Refills: 0 | Status: SHIPPED | OUTPATIENT
Start: 2021-11-02 | End: 2022-01-12

## 2021-11-02 RX ORDER — OXYCODONE HYDROCHLORIDE AND ACETAMINOPHEN 5; 325 MG/1; MG/1
2 TABLET ORAL EVERY 4 HOURS PRN
Status: DISCONTINUED | OUTPATIENT
Start: 2021-11-02 | End: 2021-11-03 | Stop reason: HOSPADM

## 2021-11-02 RX ORDER — DIPHENHYDRAMINE HYDROCHLORIDE 50 MG/ML
25 INJECTION INTRAMUSCULAR; INTRAVENOUS NIGHTLY PRN
Status: DISCONTINUED | OUTPATIENT
Start: 2021-11-02 | End: 2021-11-03 | Stop reason: HOSPADM

## 2021-11-02 RX ORDER — MAGNESIUM HYDROXIDE 1200 MG/15ML
LIQUID ORAL AS NEEDED
Status: DISCONTINUED | OUTPATIENT
Start: 2021-11-02 | End: 2021-11-02 | Stop reason: HOSPADM

## 2021-11-02 RX ORDER — SODIUM CHLORIDE 0.9 % (FLUSH) 0.9 %
10 SYRINGE (ML) INJECTION EVERY 12 HOURS SCHEDULED
Status: DISCONTINUED | OUTPATIENT
Start: 2021-11-02 | End: 2021-11-02 | Stop reason: HOSPADM

## 2021-11-02 RX ORDER — BUPIVACAINE HYDROCHLORIDE 2.5 MG/ML
INJECTION, SOLUTION EPIDURAL; INFILTRATION; INTRACAUDAL
Status: COMPLETED | OUTPATIENT
Start: 2021-11-02 | End: 2021-11-02

## 2021-11-02 RX ORDER — LABETALOL HYDROCHLORIDE 5 MG/ML
10 INJECTION, SOLUTION INTRAVENOUS EVERY 4 HOURS PRN
Status: DISCONTINUED | OUTPATIENT
Start: 2021-11-02 | End: 2021-11-03 | Stop reason: HOSPADM

## 2021-11-02 RX ORDER — PREGABALIN 75 MG/1
CAPSULE ORAL
Status: DISPENSED
Start: 2021-11-02 | End: 2021-11-03

## 2021-11-02 RX ORDER — HYDROCODONE BITARTRATE AND ACETAMINOPHEN 7.5; 325 MG/1; MG/1
1-2 TABLET ORAL EVERY 6 HOURS PRN
Qty: 45 TABLET | Refills: 0 | Status: SHIPPED | OUTPATIENT
Start: 2021-11-02 | End: 2021-11-22 | Stop reason: SDUPTHER

## 2021-11-02 RX ORDER — OXYCODONE HYDROCHLORIDE AND ACETAMINOPHEN 5; 325 MG/1; MG/1
1 TABLET ORAL EVERY 4 HOURS PRN
Status: DISCONTINUED | OUTPATIENT
Start: 2021-11-02 | End: 2021-11-03 | Stop reason: HOSPADM

## 2021-11-02 RX ORDER — CEFAZOLIN SODIUM IN 0.9 % NACL 3 G/100 ML
3 INTRAVENOUS SOLUTION, PIGGYBACK (ML) INTRAVENOUS EVERY 8 HOURS
Status: COMPLETED | OUTPATIENT
Start: 2021-11-02 | End: 2021-11-03

## 2021-11-02 RX ORDER — FENTANYL CITRATE 50 UG/ML
50 INJECTION, SOLUTION INTRAMUSCULAR; INTRAVENOUS
Status: DISCONTINUED | OUTPATIENT
Start: 2021-11-02 | End: 2021-11-02 | Stop reason: HOSPADM

## 2021-11-02 RX ORDER — AMLODIPINE BESYLATE 10 MG/1
10 TABLET ORAL EVERY MORNING
Status: DISCONTINUED | OUTPATIENT
Start: 2021-11-03 | End: 2021-11-03 | Stop reason: HOSPADM

## 2021-11-02 RX ORDER — NALOXONE HCL 0.4 MG/ML
0.4 VIAL (ML) INJECTION
Status: DISCONTINUED | OUTPATIENT
Start: 2021-11-02 | End: 2021-11-03 | Stop reason: HOSPADM

## 2021-11-02 RX ADMIN — DEXAMETHASONE SODIUM PHOSPHATE 8 MG: 4 INJECTION, SOLUTION INTRA-ARTICULAR; INTRALESIONAL; INTRAMUSCULAR; INTRAVENOUS; SOFT TISSUE at 11:08

## 2021-11-02 RX ADMIN — LISINOPRIL 10 MG: 10 TABLET ORAL at 20:25

## 2021-11-02 RX ADMIN — SODIUM CHLORIDE, POTASSIUM CHLORIDE, SODIUM LACTATE AND CALCIUM CHLORIDE 9 ML/HR: 600; 310; 30; 20 INJECTION, SOLUTION INTRAVENOUS at 08:17

## 2021-11-02 RX ADMIN — HYDROMORPHONE HYDROCHLORIDE 0.5 MG: 1 INJECTION, SOLUTION INTRAMUSCULAR; INTRAVENOUS; SUBCUTANEOUS at 15:23

## 2021-11-02 RX ADMIN — ROCURONIUM BROMIDE 10 MG: 10 INJECTION, SOLUTION INTRAVENOUS at 13:17

## 2021-11-02 RX ADMIN — ROCURONIUM BROMIDE 20 MG: 10 INJECTION, SOLUTION INTRAVENOUS at 12:54

## 2021-11-02 RX ADMIN — ROCURONIUM BROMIDE 20 MG: 10 INJECTION, SOLUTION INTRAVENOUS at 11:59

## 2021-11-02 RX ADMIN — FENTANYL CITRATE 100 MCG: 50 INJECTION, SOLUTION INTRAMUSCULAR; INTRAVENOUS at 13:49

## 2021-11-02 RX ADMIN — FAMOTIDINE 20 MG: 20 TABLET, FILM COATED ORAL at 08:16

## 2021-11-02 RX ADMIN — ROPIVACAINE HYDROCHLORIDE 6 ML/HR: 2 INJECTION, SOLUTION EPIDURAL; INFILTRATION at 14:35

## 2021-11-02 RX ADMIN — PREGABALIN 75 MG: 75 CAPSULE ORAL at 14:58

## 2021-11-02 RX ADMIN — ROCURONIUM BROMIDE 50 MG: 10 INJECTION, SOLUTION INTRAVENOUS at 11:08

## 2021-11-02 RX ADMIN — Medication 3 G: at 14:11

## 2021-11-02 RX ADMIN — BUPIVACAINE HYDROCHLORIDE 30 ML: 2.5 INJECTION, SOLUTION EPIDURAL; INFILTRATION; INTRACAUDAL; PERINEURAL at 08:45

## 2021-11-02 RX ADMIN — DEXAMETHASONE SODIUM PHOSPHATE 2 MG: 10 INJECTION, SOLUTION INTRAMUSCULAR; INTRAVENOUS at 08:55

## 2021-11-02 RX ADMIN — Medication 30 MG: at 11:32

## 2021-11-02 RX ADMIN — DIPHENHYDRAMINE HYDROCHLORIDE 25 MG: 25 CAPSULE ORAL at 20:25

## 2021-11-02 RX ADMIN — AMITRIPTYLINE HYDROCHLORIDE 10 MG: 10 TABLET, FILM COATED ORAL at 20:25

## 2021-11-02 RX ADMIN — LIDOCAINE HYDROCHLORIDE 50 MG: 10 INJECTION, SOLUTION INFILTRATION; PERINEURAL at 11:08

## 2021-11-02 RX ADMIN — POTASSIUM CHLORIDE, DEXTROSE MONOHYDRATE AND SODIUM CHLORIDE 75 ML/HR: 150; 5; 450 INJECTION, SOLUTION INTRAVENOUS at 18:51

## 2021-11-02 RX ADMIN — PROPOFOL 25 MCG/KG/MIN: 10 INJECTION, EMULSION INTRAVENOUS at 11:18

## 2021-11-02 RX ADMIN — MIDAZOLAM HYDROCHLORIDE 1 MG: 1 INJECTION, SOLUTION INTRAMUSCULAR; INTRAVENOUS at 15:05

## 2021-11-02 RX ADMIN — CETIRIZINE HYDROCHLORIDE 10 MG: 10 TABLET, FILM COATED ORAL at 20:25

## 2021-11-02 RX ADMIN — METOPROLOL TARTRATE 3 MG: 5 INJECTION INTRAVENOUS at 11:32

## 2021-11-02 RX ADMIN — LIDOCAINE HYDROCHLORIDE 0.5 ML: 10 INJECTION, SOLUTION EPIDURAL; INFILTRATION; INTRACAUDAL; PERINEURAL at 08:16

## 2021-11-02 RX ADMIN — ESMOLOL HYDROCHLORIDE 50 MG: 10 INJECTION, SOLUTION INTRAVENOUS at 11:08

## 2021-11-02 RX ADMIN — PROPOFOL 150 MG: 10 INJECTION, EMULSION INTRAVENOUS at 11:08

## 2021-11-02 RX ADMIN — BUPIVACAINE HYDROCHLORIDE 10 ML: 2.5 INJECTION, SOLUTION EPIDURAL; INFILTRATION; INTRACAUDAL at 14:48

## 2021-11-02 RX ADMIN — Medication 3 G: at 11:01

## 2021-11-02 RX ADMIN — GLYCOPYRROLATE 0.8 MG: 0.2 INJECTION INTRAMUSCULAR; INTRAVENOUS at 13:58

## 2021-11-02 RX ADMIN — PROPOFOL 50 MG: 10 INJECTION, EMULSION INTRAVENOUS at 11:12

## 2021-11-02 RX ADMIN — NEOSTIGMINE METHYLSULFATE 4 MG: 0.5 INJECTION INTRAVENOUS at 13:58

## 2021-11-02 RX ADMIN — TIZANIDINE 4 MG: 4 TABLET ORAL at 15:03

## 2021-11-02 RX ADMIN — CEFAZOLIN 3 G: 10 INJECTION, POWDER, FOR SOLUTION INTRAVENOUS at 20:57

## 2021-11-02 RX ADMIN — MULTIVITAMIN TABLET 1 TABLET: TABLET at 18:44

## 2021-11-02 RX ADMIN — FENTANYL CITRATE 100 MCG: 50 INJECTION, SOLUTION INTRAMUSCULAR; INTRAVENOUS at 08:45

## 2021-11-02 RX ADMIN — HYDROMORPHONE HYDROCHLORIDE 1 MG: 1 INJECTION, SOLUTION INTRAMUSCULAR; INTRAVENOUS; SUBCUTANEOUS at 14:57

## 2021-11-02 RX ADMIN — ESMOLOL HYDROCHLORIDE 50 MG: 10 INJECTION, SOLUTION INTRAVENOUS at 11:18

## 2021-11-02 RX ADMIN — BUPIVACAINE HYDROCHLORIDE 20 ML: 2.5 INJECTION, SOLUTION EPIDURAL; INFILTRATION; INTRACAUDAL at 08:55

## 2021-11-02 RX ADMIN — HYDROCODONE BITARTRATE AND ACETAMINOPHEN 1 TABLET: 7.5; 325 TABLET ORAL at 20:33

## 2021-11-02 RX ADMIN — ONDANSETRON 4 MG: 2 INJECTION INTRAMUSCULAR; INTRAVENOUS at 13:51

## 2021-11-02 RX ADMIN — PREGABALIN 75 MG: 75 CAPSULE ORAL at 20:26

## 2021-11-02 RX ADMIN — METOPROLOL TARTRATE 2 MG: 5 INJECTION INTRAVENOUS at 13:05

## 2021-11-02 NOTE — ANESTHESIA PROCEDURE NOTES
Airway  Urgency: elective    Date/Time: 11/2/2021 11:13 AM  Airway not difficult    General Information and Staff    Patient location during procedure: OR  CRNA: Julian Bates CRNA    Indications and Patient Condition  Indications for airway management: airway protection    Preoxygenated: yes  MILS not maintained throughout  Mask difficulty assessment: 1 - vent by mask    Final Airway Details  Final airway type: endotracheal airway      Successful airway: ETT  Cuffed: yes   Successful intubation technique: direct laryngoscopy  Endotracheal tube insertion site: oral  Blade: Ancelmo  Blade size: 3  ETT size (mm): 7.0  Cormack-Lehane Classification: grade I - full view of glottis  Placement verified by: chest auscultation and capnometry   Cuff volume (mL): 8  Measured from: lips  ETT/EBT  to lips (cm): 20  Number of attempts at approach: 1  Assessment: lips, teeth, and gum same as pre-op and atraumatic intubation    Additional Comments  Negative epigastric sounds, Breath sound equal bilaterally with symmetric chest rise and fall

## 2021-11-02 NOTE — PLAN OF CARE
Problem: Adult Inpatient Plan of Care  Goal: Plan of Care Review  Outcome: Ongoing, Progressing  Flowsheets (Taken 11/2/2021 1928)  Progress: improving  Plan of Care Reviewed With: patient  Outcome Summary: Arrived from PACU. VSS, RA, A&Ox4. LLE dressing CDI. Numbness/tingling in LLE, baseline neuropathy. Worked w/ PT in PACU. Voiding spontaeously. Up w/ assist of 2. Waffle mattress in place. Continue to monitor.  Goal: Patient-Specific Goal (Individualized)  Outcome: Ongoing, Progressing  Goal: Absence of Hospital-Acquired Illness or Injury  Outcome: Ongoing, Progressing  Intervention: Identify and Manage Fall Risk  Recent Flowsheet Documentation  Taken 11/2/2021 1821 by Virgen Marc RN  Safety Promotion/Fall Prevention:   activity supervised   assistive device/personal items within reach   clutter free environment maintained   elopement precautions   fall prevention program maintained   gait belt   nonskid shoes/slippers when out of bed   room organization consistent   safety round/check completed  Intervention: Prevent Skin Injury  Recent Flowsheet Documentation  Taken 11/2/2021 1821 by Virgen Marc RN  Body Position: lower extremity elevated, left  Skin Protection:   adhesive use limited   incontinence pads utilized   transparent dressing maintained   tubing/devices free from skin contact  Intervention: Prevent Infection  Recent Flowsheet Documentation  Taken 11/2/2021 1821 by Virgen Marc RN  Infection Prevention: environmental surveillance performed  Goal: Optimal Comfort and Wellbeing  Outcome: Ongoing, Progressing  Intervention: Provide Person-Centered Care  Recent Flowsheet Documentation  Taken 11/2/2021 1821 by Virgen Marc RN  Trust Relationship/Rapport: care explained  Goal: Readiness for Transition of Care  Outcome: Ongoing, Progressing  Intervention: Mutually Develop Transition Plan  Recent Flowsheet Documentation  Taken 11/2/2021 1829 by Virgen Marc, RN  Transportation  Anticipated: family or friend will provide  Patient/Family Anticipated Services at Transition: none  Patient/Family Anticipates Transition to: home with family  Taken 11/2/2021 1828 by Virgen Marc RN  Equipment Currently Used at Home: cane, straight     Problem: Diabetes Comorbidity  Goal: Blood Glucose Level Within Desired Range  Outcome: Ongoing, Progressing     Problem: Fall Injury Risk  Goal: Absence of Fall and Fall-Related Injury  Outcome: Ongoing, Progressing  Intervention: Identify and Manage Contributors to Fall Injury Risk  Recent Flowsheet Documentation  Taken 11/2/2021 1821 by Virgen Marc RN  Medication Review/Management: medications reviewed  Intervention: Promote Injury-Free Environment  Recent Flowsheet Documentation  Taken 11/2/2021 1821 by Virgen Marc RN  Safety Promotion/Fall Prevention:   activity supervised   assistive device/personal items within reach   clutter free environment maintained   elopement precautions   fall prevention program maintained   gait belt   nonskid shoes/slippers when out of bed   room organization consistent   safety round/check completed     Problem: Adjustment to Decreased Mobility and [place] (Orthopaedic Rehabilitation)  Goal: Optimal Coping  Outcome: Ongoing, Progressing  Intervention: Support Psychosocial Response to Injury  Recent Flowsheet Documentation  Taken 11/2/2021 1821 by Virgen Marc, RN  Family/Support System Care:   support provided   self-care encouraged     Problem: BADL (Basic Activity of Daily Living) Impairment (Orthopaedic Rehabilitation)  Goal: Optimal Safe BADL Performance  Outcome: Ongoing, Progressing     Problem: Bowel Elimination Impairment (Orthopaedic Rehabilitation)  Goal: Effective Bowel Elimination  Outcome: Ongoing, Progressing     Problem: IADL (Instrumental Activity of Daily Living) Impairment (Orthopaedic Rehabilitation)  Goal: Optimal Safe IADL Performance  Outcome: Ongoing, Progressing     Problem:  Infection (Orthopaedic Rehabilitation)  Goal: Absence of Infection Signs/Symptoms  Outcome: Ongoing, Progressing     Problem: Mobility Impairment (Orthopaedic Rehabilitation)  Goal: Optimal Mobility [place] and Safety  Outcome: Ongoing, Progressing   Goal Outcome Evaluation:  Plan of Care Reviewed With: patient        Progress: improving  Outcome Summary: Arrived from PACU. VSS, RA, A&Ox4. LLE dressing CDI. Numbness/tingling in LLE, baseline neuropathy. Worked w/ PT in PACU. Voiding spontaeously. Up w/ assist of 2. Waffle mattress in place. Continue to monitor.

## 2021-11-02 NOTE — ANESTHESIA POSTPROCEDURE EVALUATION
Patient: Ciera Patton    Procedure Summary     Date: 11/02/21 Room / Location:  [facility]  /  [facility]    Anesthesia Start: 1101 Anesthesia Stop: 1457    Procedures:       left calcaneal osteotomy, 1st metatarsal osteotomy, posterior tibial tendon transfer, peroneus longus tenodesis to brevis, achilles lengthening, possible toe tendon lengthening (Left Ankle)      METATARSAL OSTEOTOMY (Left Foot) Diagnosis:       Weakness of left lower extremity      Neuropathy      (Weakness of left lower extremity [R29.898])      (Neuropathy [G62.9])    Surgeons: Keesha Macias MD Provider: Wilfred Gottlieb Jr., MD    Anesthesia Type: general with block ASA Status: 3          Anesthesia Type: general with block    Vitals  Vitals Value Taken Time   /57 11/02/21 1620   Temp 97.3 °F (36.3 °C) 11/02/21 1445   Pulse 56 11/02/21 1557   Resp 18 11/02/21 1530   SpO2 91 % 11/02/21 1623   Vitals shown include unvalidated device data.        Post Anesthesia Care and Evaluation    Patient location during evaluation: PACU  Patient participation: complete - patient participated  Level of consciousness: awake and alert  Pain management: adequate  Airway patency: patent  Anesthetic complications: No anesthetic complications  PONV Status: none  Cardiovascular status: hemodynamically stable and acceptable  Respiratory status: nonlabored ventilation and acceptable  Hydration status: acceptable

## 2021-11-02 NOTE — ANESTHESIA PROCEDURE NOTES
Popliteal cath    Pre-sedation assessment completed: 11/2/2021 8:33 AM    Patient reassessed immediately prior to procedure    Patient location during procedure: pre-op  Start time: 11/2/2021 8:33 AM  Reason for block: at surgeon's request and post-op pain management  Performed by  CRNA: Yvon Vidales, CRNA  Assisted by: Chandni Dean RN  Preanesthetic Checklist  Completed: patient identified, IV checked, site marked, risks and benefits discussed, surgical consent, monitors and equipment checked, pre-op evaluation and timeout performed  Prep:  Sterile barriers:cap, gloves, mask and sterile barriers  Prep: ChloraPrep  Patient monitoring: blood pressure monitoring, continuous pulse oximetry and EKG  Procedure    Sedation: yes  Performed under: local infiltration  Guidance:ultrasound guided  Images:still images obtained, printed/placed on chart    Laterality:left  Block Type:popliteal  Injection Technique:catheter  Needle Type:echogenic  Needle Gauge:18 G  Resistance on Injection: none  Catheter Size:20 G  Cath Depth at skin: 10 cm    Medications Used: fentaNYL citrate (PF) (SUBLIMAZE) injection, 100 mcg  bupivacaine PF (MARCAINE) 0.25 % injection, 30 mL  Med administered at 11/2/2021 8:45 AM      Medications  Preservative Free Saline:10ml    Post Assessment  Injection Assessment: negative aspiration for heme, no paresthesia on injection and incremental injection  Patient Tolerance:comfortable throughout block  Complications:no  Additional Notes  Procedure:                                                         The pt was placed in  lateral position.  The Insertion site was  prepped and Draped in sterile fashion.  The pt was anesthetized with  IV Sedation( see meds).  Skin and cutaneous tissue was infiltrated and anesthetized with 1% Lidocaine 3 mls via a 25g needle.  A BBraun 4 inch 18g echogenic needle was then  inserted approximately 3 cm proximal to the popliteal fossa at the lateral mid biceps femoris and  advanced In-plane with Ultrasound guidance.  Normal Saline PSF was utilized for hydrodissection of tissue.  The popliteal artery was visualized and the common peroneal and tibial bifurcation was located.  LA injection spread was visualized, injection was incremental 1-5ml, injection pressure was normal or little, no intraneural injection, no vascular injection.  .  A BBraun 20g wire stylet catheter was placed via the needle with ultrasound visualization and confirmation with NS fluid bolus. The labeled catheter was then secured at insertion site with exofin tissue adhesive, benzoin, steristrips  and a CHG transparent dressing was placed over. Thank you
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
## 2021-11-02 NOTE — ANESTHESIA PREPROCEDURE EVALUATION
Anesthesia Evaluation     Patient summary reviewed and Nursing notes reviewed   no history of anesthetic complications:  NPO Solid Status: > 8 hours  NPO Liquid Status: > 2 hours           Airway   TM distance: >3 FB  Neck ROM: full  No difficulty expected  Dental - normal exam     Pulmonary - normal exam   (+) a smoker Former,   Cardiovascular - normal exam    ECG reviewed    (+) hypertension,   (-) angina, orthopnea, YI      Neuro/Psych- negative ROS  (-) seizures, CVA  GI/Hepatic/Renal/Endo    (+) obesity,  GERD,  thyroid problem hypothyroidism    Musculoskeletal     Abdominal    Substance History      OB/GYN          Other   arthritis,                    Anesthesia Plan    ASA 3     general with block     intravenous induction     Anesthetic plan, all risks, benefits, and alternatives have been provided, discussed and informed consent has been obtained with: patient.    Plan discussed with CRNA.

## 2021-11-02 NOTE — BRIEF OP NOTE
Orthopedics left calcaneal osteotomy, 1st metatarsal osteotomy, posterior tibial tendon transfer, peroneus longus tenodesis to brevis, achilles lengthening, possible toe tendon lengthening, METATARSAL OSTEOTOMY  Brief Op Note    Ciera Patton  11/2/2021    Pre-op Diagnosis:   Left foot cavovarus deformity with contracture of peroneus longus, contracture of Achilles tendon, hindfoot varus, flexion contracture toes 2 through 5, weakness of anterior dorsiflexors and everters    Post-op Diagnosis:  Same       Post-Op Diagnosis Codes:     * Weakness of left lower extremity [R29.898]     * Neuropathy [G62.9]    Procedure(s):  left calcaneal osteotomy, 1st metatarsal osteotomy, posterior tibial tendon transfer, peroneus longus tenodesis to brevis, achilles lengthening, possible toe tendon lengthening  METATARSAL OSTEOTOMY    Surgeon(s):  Keesha Macias MD    Circulator: Sade Higgins RN; Mercedes Mcfarland RN  Radiology Technologist: Solo Ochoa Person: Julian Drummond; Laurence Bowens  Assistant: Gabbi Mckenzie PA-C     Assistant: Gabbi Mckenzie PA-C  was responsible for performing the following activities: Retraction, Suction, Irrigation, Suturing, Closing and Placing Dressing and their skilled assistance was necessary for the success of this case.    Anesthesia:  General with Block    Staff:   Circulator: Sade Higgins RN; Mercedes Mcfarland RN  Radiology Technologist: Solo Ochoa Person: Tito Drummond Paula M  Assistant: Gabbi Mckenzie PA-C    Estimated Blood Loss:100cc      Specimens: None      Drains: None    Complications:  None    Tourniquet:: 120 minutes    Dressing: Splint    Disposition: Recovery stable    Keesha Macias MD     Date: 11/2/2021  Time: 14:06 EDT
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
## 2021-11-02 NOTE — DISCHARGE INSTRUCTIONS
DR MONTAÑO DISCHARGE INSTRUCTIONS   (these instructions are the most important, and supercede any other hospital paperwork)      1. Do not put weight on operated foot, use crutches, wheelchair,  walker or knee walker  2. Elevate operated foot over heart.  Keep pressure off the heel, put pillows under the calf and let the heel hang free so there is no pressure from the splint on the heel.    3. Keep the splint dry and intact- the ace bandage may be tightened or loosened, but do not remove the splint underneath the ace bandage.    4. Call the office if any problems: [phone]  5. Take the Zofran with the pain meds if you have nausea/vomiting  6. Take lovenox to help prevent blood clots

## 2021-11-02 NOTE — PLAN OF CARE
Problem: Adult Inpatient Plan of Care  Goal: Plan of Care Review  Flowsheets (Taken 11/2/2021 1600)  Progress: improving  Plan of Care Reviewed With: patient  Outcome Summary: PT eval complete. Pt performed bed mobility with min A. STS and stand-pivot transfers from bed to BSC and BSC to bed performed with mod Ax2 and RW. Pt able to maintain NWB at L LE. Reviewed strict, NWB at L LE precautions. Pt will attempt knee scooter with PT in the AM. Pending improvement with independence during mobility tasks, recommend home with assist. If pt continues to require significant assistance, recommend IPR.   Goal Outcome Evaluation:  Plan of Care Reviewed With: patient        Progress: improving  Outcome Summary: PT eval complete. Pt performed bed mobility with min A. STS and stand-pivot transfers from bed to BSC and BSC to bed performed with mod Ax2 and RW. Pt able to maintain NWB at L LE. Reviewed strict, NWB at L LE precautions. Pt will attempt knee scooter with PT in the AM. Pending improvement with independence during mobility tasks, recommend home with assist. If pt continues to require significant assistance, recommend IPR.
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
## 2021-11-02 NOTE — ANESTHESIA PROCEDURE NOTES
Femoral single shot    Pre-sedation assessment completed: 11/2/2021 8:50 AM    Patient reassessed immediately prior to procedure    Patient location during procedure: OR  Start time: 11/2/2021 8:50 AM  Reason for block: post-op pain management  Performed by  CRNA: Yvon Vidales, CRNA  Assisted by: Chandni Dean RN  Preanesthetic Checklist  Completed: patient identified, IV checked, site marked, surgical consent, monitors and equipment checked, pre-op evaluation and timeout performed  Prep:  Sterile barriers:gloves, cap, sterile barriers and mask  Prep: ChloraPrep  Patient monitoring: blood pressure monitoring, continuous pulse oximetry and EKG  Procedure    Guidance:ultrasound guided, nerve stimulator and landmark technique  Images:still images not obtained    Laterality:left  Block Type:femoral  Injection Technique:single-shot  Needle Type:short-bevel  Needle Gauge:20 G  Resistance on Injection: none    Medications Used: dexamethasone sodium phosphate injection, 2 mg  bupivacaine PF (MARCAINE) 0.25 % injection, 20 mL  Med administered at 11/2/2021 8:55 AM      Medications  Preservative Free Saline:5ml    Post Assessment  Injection Assessment: negative aspiration for heme, no paresthesia on injection and incremental injection  Patient Tolerance:comfortable throughout block  Complications:no  Additional Notes  The BBRaun 360 degree echogenic needle was introduced in plane, in a lateral to medial direction at the level of the inguinal crease.  Under ultrasound guidance, the femoral artery and vein where located.  The needle was then directed below Fascia Iliacus towards the Femoral nerve.  NS was utilized to hydro dissect and sandip needle advancement towards the target structure.   LA was injected incrementally in 3-5 ml aliquots with negative aspirate.  LA spread was visualized around the nerve, negative intraneural injection, low injection pressures.  Thank you

## 2021-11-02 NOTE — PROGRESS NOTES
Orthopedic Foot/Ankle Progress Note    Subjective     Post-Op:Day of Surgery  Procedure(s):  left calcaneal osteotomy, 1st metatarsal osteotomy, posterior tibial tendon transfer, peroneus longus tenodesis to brevis, achilles lengthening, possible toe tendon lengthening  METATARSAL OSTEOTOMY  Laterality:Left  Left      Systemic or Specific Complaints: sleepy in recovery, moderate pain  Objective     Vital signs in last 24 hours:  Temp:  [98.1 °F (36.7 °C)] 98.1 °F (36.7 °C)  Heart Rate:  [88] 88  Resp:  [16] 16  BP: (142)/(82) 142/82    Neurovascular: left foot toes pink               Wound: left splint dry    Data Review  Lab Results (last 24 hours)     Procedure Component Value Units Date/Time    POC Glucose Once [[id number]]  (Normal) Collected: 11/02/21 0815    Specimen: Blood Updated: 11/02/21 0816     Glucose 92 mg/dL      Comment: Meter: [id number] : [id number] Jonathan CAMPUZANO               Assessment/Plan     Status post- complex left cavo-varus recon  -elevate, non-wt bear  -2 weeks mono Macias MD    Date: 11/2/2021  Time: 14:53 EDT

## 2021-11-03 VITALS
DIASTOLIC BLOOD PRESSURE: 78 MMHG | TEMPERATURE: 99.2 F | HEART RATE: 93 BPM | SYSTOLIC BLOOD PRESSURE: 153 MMHG | HEIGHT: 69 IN | WEIGHT: 279 LBS | BODY MASS INDEX: 41.32 KG/M2 | RESPIRATION RATE: 18 BRPM | OXYGEN SATURATION: 95 %

## 2021-11-03 PROBLEM — G89.18 ACUTE POSTOPERATIVE PAIN: Status: ACTIVE | Noted: 2021-11-03

## 2021-11-03 LAB
ANION GAP SERPL CALCULATED.3IONS-SCNC: 13 MMOL/L (ref 5–15)
BUN SERPL-MCNC: 15 MG/DL (ref 6–20)
BUN/CREAT SERPL: 20.5 (ref 7–25)
CALCIUM SPEC-SCNC: 9.9 MG/DL (ref 8.6–10.5)
CHLORIDE SERPL-SCNC: 103 MMOL/L (ref 98–107)
CO2 SERPL-SCNC: 23 MMOL/L (ref 22–29)
CREAT SERPL-MCNC: 0.73 MG/DL (ref 0.57–1)
GFR SERPL CREATININE-BSD FRML MDRD: 83 ML/MIN/1.73
GLUCOSE SERPL-MCNC: 125 MG/DL (ref 65–99)
HCT VFR BLD AUTO: 33.8 % (ref 34–46.6)
HGB BLD-MCNC: 11.1 G/DL (ref 12–15.9)
POTASSIUM SERPL-SCNC: 4.7 MMOL/L (ref 3.5–5.2)
SODIUM SERPL-SCNC: 139 MMOL/L (ref 136–145)

## 2021-11-03 PROCEDURE — G0378 HOSPITAL OBSERVATION PER HR: HCPCS

## 2021-11-03 PROCEDURE — 97116 GAIT TRAINING THERAPY: CPT

## 2021-11-03 PROCEDURE — 85018 HEMOGLOBIN: CPT | Performed by: ORTHOPAEDIC SURGERY

## 2021-11-03 PROCEDURE — 85014 HEMATOCRIT: CPT | Performed by: ORTHOPAEDIC SURGERY

## 2021-11-03 PROCEDURE — 25010000002 ENOXAPARIN PER 10 MG: Performed by: ORTHOPAEDIC SURGERY

## 2021-11-03 PROCEDURE — 97530 THERAPEUTIC ACTIVITIES: CPT

## 2021-11-03 PROCEDURE — 25010000002 CEFAZOLIN PER 500 MG: Performed by: ORTHOPAEDIC SURGERY

## 2021-11-03 PROCEDURE — 99024 POSTOP FOLLOW-UP VISIT: CPT | Performed by: ORTHOPAEDIC SURGERY

## 2021-11-03 PROCEDURE — 80048 BASIC METABOLIC PNL TOTAL CA: CPT | Performed by: ORTHOPAEDIC SURGERY

## 2021-11-03 RX ORDER — DOCUSATE SODIUM 100 MG/1
100 CAPSULE, LIQUID FILLED ORAL 2 TIMES DAILY
Qty: 60 CAPSULE | Refills: 0 | Status: SHIPPED | OUTPATIENT
Start: 2021-11-03

## 2021-11-03 RX ADMIN — AMLODIPINE BESYLATE 10 MG: 10 TABLET ORAL at 07:58

## 2021-11-03 RX ADMIN — CEFAZOLIN 3 G: 10 INJECTION, POWDER, FOR SOLUTION INTRAVENOUS at 11:17

## 2021-11-03 RX ADMIN — CEFAZOLIN 3 G: 10 INJECTION, POWDER, FOR SOLUTION INTRAVENOUS at 04:29

## 2021-11-03 RX ADMIN — ENOXAPARIN SODIUM 40 MG: 40 INJECTION SUBCUTANEOUS at 08:00

## 2021-11-03 RX ADMIN — MULTIVITAMIN TABLET 1 TABLET: TABLET at 08:00

## 2021-11-03 RX ADMIN — LEVOTHYROXINE SODIUM 75 MCG: 0.07 TABLET ORAL at 08:00

## 2021-11-03 RX ADMIN — LISINOPRIL 10 MG: 10 TABLET ORAL at 08:00

## 2021-11-03 RX ADMIN — PREGABALIN 75 MG: 75 CAPSULE ORAL at 08:00

## 2021-11-03 RX ADMIN — HYDROCODONE BITARTRATE AND ACETAMINOPHEN 1 TABLET: 7.5; 325 TABLET ORAL at 11:21

## 2021-11-03 NOTE — PROGRESS NOTES
Orthopedic  Progress Note    Subjective     Post-Operative Day: 1 Day Post-Op  Procedure(s):  left calcaneal osteotomy, 1st metatarsal osteotomy, posterior tibial tendon transfer, peroneus longus tenodesis to brevis, achilles lengthening  METATARSAL OSTEOTOMY  Laterality:Left  Left      Systemic or Specific Complaints: good pain control  Objective     Vital signs in last 24 hours:  Temp:  [97.3 °F (36.3 °C)-99.2 °F (37.3 °C)] 99.2 °F (37.3 °C)  Heart Rate:  [] 93  Resp:  [16-26] 18  BP: ()/(47-95) 153/78    Neurovascular: left toes pink no change in n-v               Wound: left splint dry    Data Review  Lab Results (last 24 hours)     Procedure Component Value Units Date/Time    Basic Metabolic Panel [[id number]]  (Abnormal) Collected: 11/03/21 0918    Specimen: Blood Updated: 11/03/21 1025     Glucose 125 mg/dL      BUN 15 mg/dL      Creatinine 0.73 mg/dL      Sodium 139 mmol/L      Potassium 4.7 mmol/L      Chloride 103 mmol/L      CO2 23.0 mmol/L      Calcium 9.9 mg/dL      eGFR Non African Amer 83 mL/min/1.73      BUN/Creatinine Ratio 20.5     Anion Gap 13.0 mmol/L     Narrative:      GFR Normal >60  Chronic Kidney Disease <60  Kidney Failure <15      Hemoglobin & Hematocrit, Blood [[id number]]  (Abnormal) Collected: 11/03/21 0918    Specimen: Blood Updated: 11/03/21 1003     Hemoglobin 11.1 g/dL      Hematocrit 33.8 %     Hemoglobin & Hematocrit, Blood [[id number]]  (Normal) Collected: 11/02/21 1939    Specimen: Blood Updated: 11/02/21 2046     Hemoglobin 12.1 g/dL      Hematocrit 38.3 %         Microbiology Results (last 10 days)     Procedure Component Value - Date/Time    COVID PRE-OP / PRE-PROCEDURE SCREENING ORDER (NO ISOLATION) - Swab, Nasopharynx [[id number]]  (Normal) Collected: 10/31/21 1248    Lab Status: Final result Specimen: Swab from Nasopharynx Updated: 10/31/21 2125    Narrative:      The following orders were created for panel order COVID PRE-OP / PRE-PROCEDURE SCREENING ORDER (NO  ISOLATION) - Swab, Nasopharynx.  Procedure                               Abnormality         Status                     ---------                               -----------         ------                     COVID-19, APTIMA PANTHER...[[id number]]  Normal              Final result                 Please view results for these tests on the individual orders.    COVID-19, APTIMA PANTHER [facility] IN-HOUSE NP/OP SWAB IN UTM/VTM/SALINE TRANSPORT MEDIA 24HR TAT - Swab, Nasopharynx [[id number]]  (Normal) Collected: 10/31/21 1248    Lab Status: Final result Specimen: Swab from Nasopharynx Updated: 10/31/21 2125     COVID19 Not Detected    Narrative:      Fact sheet for providers: https://www.fda.gov/media/067995/download     Fact sheet for patients: https://www.fda.gov/media/970904/download    Test performed by RT PCR.              Assessment/Plan     Status post- complex recon left foot  -ok to go home  -see me 2 weeks  -Lovenox 2 weeks         Keesha Degroot MD  Date: 11/3/2021  Time: 12:31 EDT

## 2021-11-03 NOTE — PLAN OF CARE
Goal Outcome Evaluation:  Plan of Care Reviewed With: patient        Progress: improving  Outcome Summary: Pt. performed sit to stand and bed to chair transfer w/rolling walker, contact guard assist of 2. She ambulated 350' w/knee scooter, contact guard assist of 2. She performed 3 steps w/L ascending handrail and R single touch cane, contact guard assist. Gait and stair training limited by fatigue. Educated pt. safety w/knee scooter ambulation/brakes, NWB status, car transfers. Recommend home with assist upon discharge.

## 2021-11-03 NOTE — PLAN OF CARE
Problem: Adult Inpatient Plan of Care  Goal: Plan of Care Review  Outcome: Ongoing, Progressing  Flowsheets (Taken 11/3/2021 0444)  Progress: improving  Plan of Care Reviewed With: patient  Goal: Patient-Specific Goal (Individualized)  Outcome: Ongoing, Progressing  Goal: Absence of Hospital-Acquired Illness or Injury  Outcome: Ongoing, Progressing  Intervention: Identify and Manage Fall Risk  Recent Flowsheet Documentation  Taken 11/3/2021 0400 by Myesha Garcias RN  Safety Promotion/Fall Prevention:   activity supervised   assistive device/personal items within reach   safety round/check completed  Taken 11/3/2021 0200 by Myesha Garcias RN  Safety Promotion/Fall Prevention:   activity supervised   assistive device/personal items within reach   safety round/check completed  Taken 11/3/2021 0000 by Myesha Garcias RN  Safety Promotion/Fall Prevention:   activity supervised   assistive device/personal items within reach   safety round/check completed  Taken 11/2/2021 2200 by Myesha Garcias RN  Safety Promotion/Fall Prevention:   activity supervised   assistive device/personal items within reach   safety round/check completed  Taken 11/2/2021 2000 by Myesha Garcias RN  Safety Promotion/Fall Prevention:   activity supervised   assistive device/personal items within reach   clutter free environment maintained   fall prevention program maintained   lighting adjusted   muscle strengthening facilitated   nonskid shoes/slippers when out of bed   room organization consistent   safety round/check completed  Intervention: Prevent Skin Injury  Recent Flowsheet Documentation  Taken 11/3/2021 0400 by Myesha Garcias RN  Body Position:   position changed independently   supine, legs elevated  Taken 11/3/2021 0200 by Myesha Garcias RN  Body Position: position changed independently  Taken 11/3/2021 0000 by Myesha Garcias RN  Body Position:   sitting up in bed   position changed independently  Taken 11/2/2021 2200 by  Myesha Garcias RN  Body Position:   position changed independently   supine, legs elevated  Taken 11/2/2021 2000 by Myesha Garcias RN  Body Position:   supine, legs elevated   position changed independently  Intervention: Prevent and Manage VTE (venous thromboembolism) Risk  Recent Flowsheet Documentation  Taken 11/3/2021 0400 by Myesha Garcias RN  VTE Prevention/Management:   right   sequential compression devices on  Taken 11/3/2021 0200 by Myesha Garcias RN  VTE Prevention/Management:   right   sequential compression devices on  Taken 11/3/2021 0000 by Myesha Garcias RN  VTE Prevention/Management:   right   sequential compression devices on  Taken 11/2/2021 2200 by Myesha Garcias RN  VTE Prevention/Management:   right   sequential compression devices on  Taken 11/2/2021 2000 by Myesha Garcias RN  VTE Prevention/Management:   right   sequential compression devices on  Intervention: Prevent Infection  Recent Flowsheet Documentation  Taken 11/3/2021 0400 by Myesha Garcias RN  Infection Prevention:   hand hygiene promoted   rest/sleep promoted  Taken 11/3/2021 0200 by Myesha Garcias RN  Infection Prevention:   hand hygiene promoted   rest/sleep promoted  Taken 11/3/2021 0000 by Myesha Garcias RN  Infection Prevention:   hand hygiene promoted   rest/sleep promoted  Taken 11/2/2021 2200 by Myesha Garcias RN  Infection Prevention:   hand hygiene promoted   rest/sleep promoted  Taken 11/2/2021 2000 by Myesha Garcias RN  Infection Prevention:   hand hygiene promoted   rest/sleep promoted  Goal: Optimal Comfort and Wellbeing  Outcome: Ongoing, Progressing  Intervention: Provide Person-Centered Care  Recent Flowsheet Documentation  Taken 11/3/2021 0400 by Myesha Garcias RN  Trust Relationship/Rapport: care explained  Taken 11/3/2021 0200 by Myesha Garcias RN  Trust Relationship/Rapport: care explained  Taken 11/3/2021 0000 by Myesha Garcias RN  Trust Relationship/Rapport: care explained  Taken  11/2/2021 2200 by Myesha Garcias RN  Trust Relationship/Rapport: care explained  Taken 11/2/2021 2000 by Myesha Garcias RN  Trust Relationship/Rapport:   care explained   choices provided   questions answered   questions encouraged   reassurance provided  Goal: Readiness for Transition of Care  Outcome: Ongoing, Progressing     Problem: Diabetes Comorbidity  Goal: Blood Glucose Level Within Desired Range  Outcome: Ongoing, Progressing     Problem: Fall Injury Risk  Goal: Absence of Fall and Fall-Related Injury  Outcome: Ongoing, Progressing  Intervention: Identify and Manage Contributors to Fall Injury Risk  Recent Flowsheet Documentation  Taken 11/3/2021 0400 by Myesha Garcias RN  Self-Care Promotion: independence encouraged  Taken 11/3/2021 0200 by Myesha Garcias RN  Self-Care Promotion: independence encouraged  Taken 11/3/2021 0000 by Myesha Garcias RN  Self-Care Promotion: independence encouraged  Taken 11/2/2021 2200 by Myesha Garcias RN  Self-Care Promotion: independence encouraged  Taken 11/2/2021 2000 by Myesha Garcias RN  Medication Review/Management: medications reviewed  Self-Care Promotion: independence encouraged  Intervention: Promote Injury-Free Environment  Recent Flowsheet Documentation  Taken 11/3/2021 0400 by Myesha Garcias RN  Safety Promotion/Fall Prevention:   activity supervised   assistive device/personal items within reach   safety round/check completed  Taken 11/3/2021 0200 by Myesha Garcias RN  Safety Promotion/Fall Prevention:   activity supervised   assistive device/personal items within reach   safety round/check completed  Taken 11/3/2021 0000 by Myesha Garcias RN  Safety Promotion/Fall Prevention:   activity supervised   assistive device/personal items within reach   safety round/check completed  Taken 11/2/2021 2200 by Myesha Garcias RN  Safety Promotion/Fall Prevention:   activity supervised   assistive device/personal items within reach   safety round/check  completed  Taken 11/2/2021 2000 by Myesha Garcias RN  Safety Promotion/Fall Prevention:   activity supervised   assistive device/personal items within reach   clutter free environment maintained   fall prevention program maintained   lighting adjusted   muscle strengthening facilitated   nonskid shoes/slippers when out of bed   room organization consistent   safety round/check completed     Problem: Adjustment to Decreased Mobility and [place] ([facility] Rehabilitation)  Goal: Optimal Coping  Outcome: Ongoing, Progressing  Intervention: Support Psychosocial Response to Injury  Recent Flowsheet Documentation  Taken 11/2/2021 2000 by Myesha Garcias RN  Family/Support System Care:   self-care encouraged   support provided     Problem: BADL (Basic Activity of Daily Living) Impairment ([facility] Rehabilitation)  Goal: Optimal Safe BADL Performance  Outcome: Ongoing, Progressing     Problem: Bowel Elimination Impairment ([facility])  Goal: Effective Bowel Elimination  Outcome: Ongoing, Progressing     Problem: IADL (Instrumental Activity of Daily Living) Impairment ([facility])  Goal: Optimal Safe IADL Performance  Outcome: Ongoing, Progressing     Problem: Infection ([facility])  Goal: Absence of Infection Signs/Symptoms  Outcome: Ongoing, Progressing  Intervention: Prevent or Manage Infection  Recent Flowsheet Documentation  Taken 11/2/2021 2000 by Myesha Garcias RN  Infection Management: aseptic technique maintained     Problem: Mobility Impairment ([facility])  Goal: Optimal Mobility [place] and Safety  Outcome: Ongoing, Progressing   Goal Outcome Evaluation:  Plan of Care Reviewed With: patient      Pt currently in bed resting quietly. No complaints of pain or discomfort at this time. LLE with splint in ACE wrap, elevated on pillow. Vitals WNL on room air. NWB to extremity. 1 assist transfer to bedside commode. Voiding well. Second  dose of IV ATB administered. Pt admits to numbness to extremity. No other observations at this time. Will continue to monitor, call bell in reach.  Progress: improving

## 2021-11-03 NOTE — PLAN OF CARE
Goal Outcome Evaluation:    Problem: Adult Inpatient Plan of Care  Goal: Plan of Care Review  Outcome: Met  Goal: Patient-Specific Goal (Individualized)  Outcome: Met  Goal: Absence of Hospital-Acquired Illness or Injury  Outcome: Met  Intervention: Identify and Manage Fall Risk  Recent Flowsheet Documentation  Taken 11/3/2021 1400 by Virgen Marc RN  Safety Promotion/Fall Prevention:   activity supervised   assistive device/personal items within reach   clutter free environment maintained   elopement precautions   fall prevention program maintained   gait belt   nonskid shoes/slippers when out of bed   room organization consistent   safety round/check completed  Taken 11/3/2021 1200 by Virgen Marc RN  Safety Promotion/Fall Prevention:   activity supervised   clutter free environment maintained   assistive device/personal items within reach   elopement precautions   fall prevention program maintained   gait belt   nonskid shoes/slippers when out of bed   room organization consistent   safety round/check completed  Taken 11/3/2021 1015 by Virgen Marc RN  Safety Promotion/Fall Prevention:   activity supervised   assistive device/personal items within reach   clutter free environment maintained   elopement precautions   fall prevention program maintained   gait belt   nonskid shoes/slippers when out of bed   room organization consistent   safety round/check completed  Taken 11/3/2021 0800 by Virgen Marc RN  Safety Promotion/Fall Prevention:   activity supervised   assistive device/personal items within reach   clutter free environment maintained   elopement precautions   fall prevention program maintained   gait belt   nonskid shoes/slippers when out of bed   room organization consistent   safety round/check completed  Intervention: Prevent Skin Injury  Recent Flowsheet Documentation  Taken 11/3/2021 1400 by Virgen Marc RN  Body Position: position changed independently  Skin  Protection:   adhesive use limited   incontinence pads utilized   transparent dressing maintained   tubing/devices free from skin contact  Taken 11/3/2021 1200 by Virgen Marc RN  Body Position: sitting up in bed  Skin Protection:   adhesive use limited   incontinence pads utilized   transparent dressing maintained   tubing/devices free from skin contact  Taken 11/3/2021 1015 by Virgen Marc RN  Body Position:   supine   lower extremity elevated, left  Skin Protection:   adhesive use limited   incontinence pads utilized   transparent dressing maintained   tubing/devices free from skin contact  Taken 11/3/2021 0800 by Virgen Marc RN  Body Position:   position changed independently   lower extremity elevated, left  Skin Protection:   adhesive use limited   incontinence pads utilized   transparent dressing maintained   tubing/devices free from skin contact  Intervention: Prevent and Manage VTE (venous thromboembolism) Risk  Recent Flowsheet Documentation  Taken 11/3/2021 1400 by Virgen Marc RN  VTE Prevention/Management: sequential compression devices off  Taken 11/3/2021 1200 by Virgen Marc RN  VTE Prevention/Management: sequential compression devices off  Taken 11/3/2021 1015 by Virgen Marc RN  VTE Prevention/Management: sequential compression devices off  Taken 11/3/2021 0800 by Virgen Marc RN  VTE Prevention/Management: sequential compression devices off  Intervention: Prevent Infection  Recent Flowsheet Documentation  Taken 11/3/2021 1400 by Virgen Marc RN  Infection Prevention: environmental surveillance performed  Taken 11/3/2021 1200 by Virgen Marc RN  Infection Prevention: environmental surveillance performed  Taken 11/3/2021 1015 by Virgen Marc RN  Infection Prevention: environmental surveillance performed  Taken 11/3/2021 0800 by Virgen Marc RN  Infection Prevention: environmental surveillance performed  Goal: Optimal Comfort and  Wellbeing  Outcome: Met  Intervention: Provide Person-Centered Care  Recent Flowsheet Documentation  Taken 11/3/2021 1400 by Virgen Marc RN  Trust Relationship/Rapport: care explained  Taken 11/3/2021 1200 by Virgen Marc RN  Trust Relationship/Rapport: care explained  Taken 11/3/2021 1015 by Virgen Marc RN  Trust Relationship/Rapport: care explained  Taken 11/3/2021 0800 by Virgen Marc RN  Trust Relationship/Rapport: care explained  Goal: Readiness for Transition of Care  Outcome: Met     Problem: Diabetes Comorbidity  Goal: Blood Glucose Level Within Desired Range  Outcome: Met     Problem: Fall Injury Risk  Goal: Absence of Fall and Fall-Related Injury  Outcome: Met  Intervention: Identify and Manage Contributors to Fall Injury Risk  Recent Flowsheet Documentation  Taken 11/3/2021 0800 by Virgen Marc RN  Medication Review/Management: medications reviewed  Intervention: Promote Injury-Free Environment  Recent Flowsheet Documentation  Taken 11/3/2021 1400 by Virgen Marc RN  Safety Promotion/Fall Prevention:   activity supervised   assistive device/personal items within reach   clutter free environment maintained   elopement precautions   fall prevention program maintained   gait belt   nonskid shoes/slippers when out of bed   room organization consistent   safety round/check completed  Taken 11/3/2021 1200 by Virgen Marc RN  Safety Promotion/Fall Prevention:   activity supervised   clutter free environment maintained   assistive device/personal items within reach   elopement precautions   fall prevention program maintained   gait belt   nonskid shoes/slippers when out of bed   room organization consistent   safety round/check completed  Taken 11/3/2021 1015 by Virgen Marc RN  Safety Promotion/Fall Prevention:   activity supervised   assistive device/personal items within reach   clutter free environment maintained   elopement precautions   fall prevention  program maintained   gait belt   nonskid shoes/slippers when out of bed   room organization consistent   safety round/check completed  Taken 11/3/2021 0800 by Virgen Marc RN  Safety Promotion/Fall Prevention:   activity supervised   assistive device/personal items within reach   clutter free environment maintained   elopement precautions   fall prevention program maintained   gait belt   nonskid shoes/slippers when out of bed   room organization consistent   safety round/check completed     Problem: Adjustment to Decreased Mobility and [place] ([facility])  Goal: Optimal Coping  Outcome: Met  Intervention: Support Psychosocial Response to Injury  Recent Flowsheet Documentation  Taken 11/3/2021 1400 by Virgen Marc, RN  Family/Support System Care:   support provided   self-care encouraged  Taken 11/3/2021 1200 by Virgen Marc RN  Family/Support System Care:   support provided   self-care encouraged  Taken 11/3/2021 1015 by Virgen Marc, RN  Family/Support System Care:   support provided   self-care encouraged  Taken 11/3/2021 0800 by Virgen Marc, RN  Family/Support System Care:   support provided   self-care encouraged     Problem: BADL (Basic Activity of Daily Living) Impairment ([facility])  Goal: Optimal Safe BADL Performance  Outcome: Met     Problem: Bowel Elimination Impairment ([facility])  Goal: Effective Bowel Elimination  Outcome: Met     Problem: IADL (Instrumental Activity of Daily Living) Impairment ([facility])  Goal: Optimal Safe IADL Performance  Outcome: Met     Problem: Infection ([facility])  Goal: Absence of Infection Signs/Symptoms  Outcome: Met     Problem: Mobility Impairment ([facility])  Goal: Optimal Mobility [place] and Safety  Outcome: Met
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
## 2021-11-03 NOTE — CASE MANAGEMENT/SOCIAL WORK
Continued Stay Note  [facility]     Patient Name: Ciera Patton  MRN: [id number]  Today's Date: 11/3/2021    Admit Date: 11/2/2021     Discharge Plan     Row Name 11/03/21 8369       Plan    Plan Home    Plan Comments Plan: Home with family, [employer] to deliver rolling walker    I spoke with patient earlier about discharge planning. She resides with her spouse and adult daughter on a 1 level home, 3 steps to enter, telling me of her special hand railing to assist with her ability to enter her home. She has the following DME: transfer bench, high commode, grab bars, straight cane. SHe plans to see if knee scooter is what she will need after she works with PT. Nursing tells me recommendations are for rolling walker which will be delivered to her today in room before she leaves provided by [employer]. She voices no concerns with [place] insurance coverage or benefits. Her PCP is Umang Headley. She has no advanced directives. She tells me she will have family assistance at discharge.    Final Discharge Disposition Code 01 - home or self-care               Discharge Codes    No documentation.               Expected Discharge Date and Time     Expected Discharge Date Expected Discharge Time    Nov 3, 2021             Kristine Richey RN

## 2021-11-03 NOTE — PROGRESS NOTES
[facility]    Acute pain service Inpatient Progress Note    Patient Name: Ciera Patton  :  1965  MRN:  [id number]        Acute Pain  Service Inpatient Progress Note:    Analgesia:Excellent  Pain Score:0/10  LOC: alert and awake  Resp Status: room air  Cardiac: VS stable  Side Effects:None  Catheter Site:clean, dressing intact and dry  Catheter type: Perihperal nerve cath with Arrow pump.  Infusion rate: 6ml/hr  Catheter Plan:Catheter to remain Insitu, Continue catheter infusion rate unchanged and Patient to be discharged home

## 2021-11-03 NOTE — DISCHARGE SUMMARY
Patient Name: Ciera Patton  MRN: [id number]  : 1965  DOS: 11/3/2021    Attending: Keesha Macias MD    Primary Care Provider: Umang Headley MD    Date of Admission:.2021  7:37 AM    Date of Discharge:  11/3/2021    Discharge Diagnosis:   S/P left  foot/ankle surgery( 1.  Achilles tendon lengthening, 2.  Valgus producing osteotomy calcaneus 3.  Tenodesis of peroneus longus to brevis 4.  Dorsiflexion  osteotomy first metatarsal     Status post 5.  Transfer of the posterior tibial tendon through the interosseous membrane to the cuboid and 6.  Open flexor tenotomies toes 2 through 5    Weakness of left lower extremity    Neuropathy    HTN (hypertension)    GERD (gastroesophageal reflux disease)    Obesity    Acute postoperative pain      Hospital Course    At admit:    Patient is a pleasant 56 y.o. female presented for scheduled surgery by Dr. Macias.  Per her note (This is a very pleasant 56-year-old woman with very longstanding weakness of the anterior dorsiflexors and everters of the left foot, she has developed a severe cavovarus deformity.  She has contracture of the Achilles.  She also has toe flexion contractures.  The ankle joint is still preserved.  We discussed tibial talocalcaneal fusion versus osteotomies and soft tissue realignment to improve the position from bracing.  The goal is the foot that we will go into a brace with comfort so that she can ambulate better.  She understands we will not be able to make her normal.  I felt that this would be a better first option impaired to the tibial talocalcaneal fusion.  At the time of surgery I was able to accomplish the osteotomies and soft tissue transfers to put the foot in neutral alignment. )     She underwent the following procedures:  1.  Achilles tendon lengthening, 2.  Valgus producing osteotomy calcaneus 3.  Tenodesis of peroneus longus to brevis 4.  Dorsiflexion  osteotomy first metatarsal 5.  Transfer of the posterior tibial  tendon through the interosseous membrane to the cuboid and 6.  Open flexor tenotomies toes 2 through 5     She tolerated surgery well is being admitted for further management.  Seen in PACU, doing fairly well, pain control is adequate at this point with peripheral nerve block catheter present..no complains of nausea, vomiting, or shortness of breath.      After admit:    Patient was provided pain medications as needed for pain control, along with popliteal nerve block infusion of Ropivacaine.    Adjustments were made to pain medications to optimize postop pain management.   Risks and benefits of opiate medications discussed with patient.  Anup report in chart was reviewed prior to discharge.    She was seen by PT has progressed well over her stay.  She used an IS for atelectasis prophylaxis and Lovenox along with mechanicals for DVT prophylaxis.  Home medications were resumed as appropriate, and labs were monitored and remained fairly stable.     With the progress she has made, she is ready for DC home today.    Keep splint clean and dry until follow up appointment with Dr. Macias.  She will have an arrow pump (instructed on it during this admit)  Discussed with patient regarding plan and she shows understanding and agreement.     Procedures Performed    11/02/21 14:08 EDT     Preoperative diagnosis: Severe left foot cavovarus deformity with hindfoot varus, contracture peroneus longus and Achilles, weakness of anterior dorsiflexors and everters, contracture of toe flexor tendons 2 through 5     Postoperative diagnosis: Same     Anesthesia: General with blocks for postop pain control     Surgeon: Keesha Macias M.D.     Operative procedure: 1.  Achilles tendon lengthening, 2.  Valgus producing osteotomy calcaneus 3.  Tenodesis of peroneus longus to brevis 4.  Dorsiflexion  osteotomy first metatarsal 5.  Transfer of the posterior tibial tendon through the interosseous membrane to the cuboid and 6.  Open flexor  "tenotomies toes 2 through 5    Pertinent Test Results:    I reviewed the patient's new clinical results.   Results from last 7 days   Lab Units 21  0918 21  1939   HEMOGLOBIN g/dL 11.1* 12.1   HEMATOCRIT % 33.8* 38.3     Results from last 7 days   Lab Units 21  0918   SODIUM mmol/L 139   POTASSIUM mmol/L 4.7   CHLORIDE mmol/L 103   CO2 mmol/L 23.0   BUN mg/dL 15   CREATININE mg/dL 0.73   CALCIUM mg/dL 9.9   GLUCOSE mg/dL 125*       I reviewed the patient's new imaging including images and reports.      Physical therapy: Pt. performed sit to stand and bed to chair transfer w/rolling walker, contact guard assist of 2. She ambulated 350' w/knee scooter, contact guard assist of 2. She performed 3 steps w/L ascending handrail and R single touch cane, contact guard assist. Gait and stair training limited by fatigue. Educated pt. safety w/knee scooter ambulation/brakes, NWB status, car transfers. Recommend home with assist upon discharge.    Discharge Assessment:    Vital Signs  Visit Vitals  /78 (BP Location: Left arm, Patient Position: Sitting)   Pulse 93   Temp 99.2 °F (37.3 °C) (Oral)   Resp 18   Ht 175.3 cm (69\")   Wt 127 kg (279 lb)   SpO2 95%   BMI 41.20 kg/m²     Temp (24hrs), Av.1 °F (36.7 °C), Min:97.3 °F (36.3 °C), Max:99.2 °F (37.3 °C)      General Appearance:    Alert, cooperative, in no acute distress   Lungs:     Clear to auscultation,respirations regular, even and unlabored    Heart:    Regular rhythm and normal rate, normal S1 and S2   Abdomen:     Normal bowel sounds, no masses, no organomegaly, soft non-tender, non-distended, no guarding, no rebound tenderness   Extremities:   Left splint CDI. Nerve block present. Good cap refill and movement left toes.    Pulses:   Pulses palpable and equal bilaterally   Skin:   No bleeding, bruising or rash   Neurologic:   Cranial nerves 2 - 12 grossly intact, sensation intact       Discharge Disposition: Home    Discharge Medications   "   Discharge Medications      New Medications      Instructions Start Date   docusate sodium 100 MG capsule  Commonly known as: Colace   100 mg, Oral, 2 Times Daily      enoxaparin 40 MG/0.4ML solution syringe  Commonly known as: Lovenox   40 mg, Subcutaneous, Every 24 Hours Scheduled, For 2 weeks      HYDROcodone-acetaminophen 7.5-325 MG per tablet  Commonly known as: NORCO   1-2 tablets, Oral, Every 6 Hours PRN      ondansetron 4 MG tablet  Commonly known as: Zofran   4 mg, Oral, Every 6 Hours PRN      oxyCODONE-acetaminophen 5-325 MG per tablet  Commonly known as: PERCOCET   1-2 tablets, Oral, Every 6 Hours PRN      Ropivacine HCl-NaCl  Commonly known as: NAROPIN   6 mL/hr (12 mg/hr), Peripheral Nerve, Continuous         Continue These Medications      Instructions Start Date   acetaminophen 500 MG tablet  Commonly known as: TYLENOL   1,000 mg, Oral, Nightly      amitriptyline 10 MG tablet  Commonly known as: ELAVIL   10 mg, Oral, Nightly      amLODIPine 10 MG tablet  Commonly known as: NORVASC   10 mg, Oral, Every Morning      cetirizine 10 MG tablet  Commonly known as: zyrTEC   10 mg, Oral, Nightly      diphenhydrAMINE 25 mg capsule  Commonly known as: BENADRYL   25 mg, Oral, Nightly      famotidine 20 MG tablet  Commonly known as: PEPCID   20 mg, Oral, Nightly PRN      furosemide 20 MG tablet  Commonly known as: LASIX   20 mg, Oral, As Needed      ibuprofen 600 MG tablet  Commonly known as: ADVIL,MOTRIN   600 mg, Oral, 2 Times Daily PRN      levothyroxine 75 MCG tablet  Commonly known as: SYNTHROID, LEVOTHROID   75 mcg, Oral, Daily, 1/2 tab extra on Sundays      lisinopril 10 MG tablet  Commonly known as: PRINIVIL,ZESTRIL   10 mg, Oral, 2 Times Daily      potassium chloride ER 20 MEQ tablet controlled-release ER tablet  Commonly known as: K-TAB   20 mEq, Oral, Daily      pregabalin 75 MG capsule  Commonly known as: LYRICA   75 mg, Oral, 2 Times Daily      tiZANidine 4 MG tablet  Commonly known as: ZANAFLEX   4  mg, Oral, 3 Times Daily PRN      Vitamin D3 1.25 MG (25837 UT) capsule   1 tablet, Oral, Weekly, Sundays      Womens 50+ Multi Vitamin/Min tablet tablet   1 tablet, Oral, Daily             Discharge Diet: Regular diet    Activity at Discharge: ALEJANDRO KEBEDE    Follow-up Appointments  Dr. Macias per her orders      Bhavani Chambers, RAMON  11/03/21  12:33 EDT

## 2021-11-05 NOTE — PROGRESS NOTES
[facility]    Nerve Cath Post Op Call    Patient Name: Ciera Patton  :  1965  MRN:  [id number]  Date of Discharge: 11/3/2021    Nerve Cath Post Op Call:    Analgesia:Excellent  Pain Score:0/10  Side Effects:None  Catheter Site:clean  Patient Controlled ON Q pump infusion rate: 6ml/hr  Catheter Plan:Will continue with plan at home without changes and The patient was instructed to call ON CALL Anesthesia provider for any questions or problems  Patient/Family instructed to call ON CALL anesthesia provider for any questions or problems.  Patient Follow Up:

## 2021-11-07 NOTE — PROGRESS NOTES
ORLANDO Esparza    Nerve Cath Post Op Call    Patient Name: Ciera Patton  :  1965  MRN:  [id number]  Date of Discharge: 11/3/2021    Nerve Cath Post Op Call:    Catheter Plan:Patient/Family member report nerve catheter previously discontinued, tip intact  Patient/Family instructed to call ON CALL anesthesia provider for any questions or problems.  Patient Follow Up:

## 2021-11-17 ENCOUNTER — OFFICE VISIT (OUTPATIENT)
Dept: ORTHOPEDIC SURGERY | Facility: CLINIC | Age: 56
End: 2021-11-17

## 2021-11-17 VITALS — TEMPERATURE: 96.4 F

## 2021-11-17 DIAGNOSIS — Z09 SURGERY FOLLOW-UP: Primary | ICD-10-CM

## 2021-11-17 PROCEDURE — 99024 POSTOP FOLLOW-UP VISIT: CPT | Performed by: ORTHOPAEDIC SURGERY

## 2021-11-17 NOTE — PROGRESS NOTES
Post-op (2 weeks status post Left calcaneal osteotomy, 1st metatarsal osteotomy, posterior tibial tendon transfer, peroneus longus tenodesis to brevis, achilles lengthening  21)      Ciera Patton is 2 weeks status post complex cavovarus foot drop reconstruction with left calcaneal osteotomy, first metatarsal osteotomy, posterior tibial tendon transfer, peroneus longus tenodesis to brevis, Achilles lengthening, 2021. She reports no fever, chills.  She reports pain is well controlled.  They have been taking lovenox for DVT prophylaxis.  They have been NWB in splint. She is putting the foot down too much when she is using her walker, I advised her she must be absolutely nonweightbearing. She has not yet obtained a knee scooter, I think that is an important thing to do.    Past Surgical History:   Procedure Laterality Date   • BACK SURGERY      fusion  L1  1993   L3 2018   •  SECTION  1997    Emergency   • COLONOSCOPY         • D & C AND LAPAROSCOPY      2014   • FOOT SURGERY Left 2021    calcaneal osteotomy, 1st metatarsal osteotomy, posterior tibial tendon transfer, peroneus longus tenodesis to brevis, achilles lengthening  - Dr. Degroot   • MYRINGOTOMY Right    • WISDOM TOOTH EXTRACTION     • WRIST SURGERY Left     Gangion cyst excision x2       Temp 96.4 °F (35.8 °C)         Good alignment, no erythema, no drainage, no sign of infection, normal post op swelling left foot, several tiny blisters from swelling, we held the foot in position to prevent any stress on the tendon transfer    ordered and reviewed x-rays today    Assessment and Plan:   1. Surgery follow-up  Her splint was carefully removed after x-ray, we held the foot in neutral alignment to prevent any stress on the tendon transfer, the more proximal incisions all the staples were removed, those on the foot which is very small and were removed every other month. We replaced her back into a splint with  Bactroban dressings. The splint is in neutral alignment. She must elevate more, and be absolutely nonweightbearing, I will see her again in 2 weeks for an exam no x-ray needed at that time. The splint should be split down the middle but otherwise not removed            Keesha Degroot MD

## 2021-11-22 DIAGNOSIS — Z09 SURGERY FOLLOW-UP: Primary | ICD-10-CM

## 2021-11-22 RX ORDER — HYDROCODONE BITARTRATE AND ACETAMINOPHEN 7.5; 325 MG/1; MG/1
1 TABLET ORAL EVERY 6 HOURS PRN
Qty: 30 TABLET | Refills: 0 | Status: SHIPPED | OUTPATIENT
Start: 2021-11-22 | End: 2021-12-02

## 2021-11-22 NOTE — TELEPHONE ENCOUNTER
Incoming Refill Request      Medication requested (name and dose): HYDROcodone-acetaminophen (NORCO) 7.5-325 MG per tablet    Pharmacy where request should be sent: [facility] [address] [place] [address]    Additional details provided by patient:     Best call back number: [phone]    Does the patient have less than a 3 day supply:  [x] Yes  [] No    Sadie Nino Rep  11/22/21, 12:20 EST

## 2021-12-01 ENCOUNTER — OFFICE VISIT (OUTPATIENT)
Dept: ORTHOPEDIC SURGERY | Facility: CLINIC | Age: 56
End: 2021-12-01

## 2021-12-01 DIAGNOSIS — M79.672 LEFT FOOT PAIN: Primary | ICD-10-CM

## 2021-12-01 DIAGNOSIS — Z09 SURGERY FOLLOW-UP: ICD-10-CM

## 2021-12-01 PROCEDURE — 99024 POSTOP FOLLOW-UP VISIT: CPT | Performed by: ORTHOPAEDIC SURGERY

## 2021-12-01 NOTE — PROGRESS NOTES
Post-op Follow-up (2 week f/u; 4 weeks status post Left calcaneal osteotomy, 1st metatarsal osteotomy, posterior tibial tendon transfer, peroneus longus tenodesis to brevis, achilles lengthening  21)      Ciera AMEZCUA Biliter is 4 weeks status post 4 weeks status post Left calcaneal osteotomy, 1st metatarsal osteotomy, posterior tibial tendon transfer, peroneus longus tenodesis to brevis, achilles lengthening  21. She reports no fever, chills.  She reports pain is well controlled.  They have been taking aspirin for DVT prophylaxis.  They have been NWB in splint.      Past Surgical History:   Procedure Laterality Date   • BACK SURGERY      fusion  L1     L3 2018   • CALCANEAL OSTEOTOMY Left 2021    Procedure: left calcaneal osteotomy, 1st metatarsal osteotomy, posterior tibial tendon transfer, peroneus longus tenodesis to brevis, achilles lengthening;  Surgeon: Keesha Macias MD;  Location:  [facility];  Service: Orthopedics;  Laterality: Left;  also open tenotomies on toes 2-5   •  SECTION  1997    Emergency   • COLONOSCOPY      2019   • D & C AND LAPAROSCOPY      2014   • FOOT SURGERY Left 2021    calcaneal osteotomy, 1st metatarsal osteotomy, posterior tibial tendon transfer, peroneus longus tenodesis to brevis, achilles lengthening  - Dr. Macias   • METATARSAL OSTEOTOMY Left 2021    Procedure: METATARSAL OSTEOTOMY;  Surgeon: Keesha Macias MD;  Location:  [facility];  Service: Orthopedics;  Laterality: Left;   • MYRINGOTOMY Right    • WISDOM TOOTH EXTRACTION     • WRIST SURGERY Left     Gangion cyst excision x2       There were no vitals taken for this visit.        Good alignment, no erythema, no drainage, no sign of infection, normal post op swelling left foot neurovascular status unchanged, the tendon transfer is functioning    none    Assessment and Plan:   1. Surgery follow-up  Doing well status post complex reconstruction.  We remove the remaining staples  and placed her in a nonweightbearing cast.  I will see her again in 6 weeks for 2 views of the foot out of the cast              Keesha Degroot MD

## 2021-12-02 ENCOUNTER — TELEPHONE (OUTPATIENT)
Dept: ORTHOPEDIC SURGERY | Facility: CLINIC | Age: 56
End: 2021-12-02

## 2021-12-02 DIAGNOSIS — Z09 SURGERY FOLLOW-UP: ICD-10-CM

## 2021-12-02 NOTE — TELEPHONE ENCOUNTER
Caller: PHYLLIS TIJERINA    Relationship: SELF    Best call back number: [phone]    Requested Prescriptions:   Requested Prescriptions      No prescriptions requested or ordered in this encounter      HYDROcodone-acetaminophen (NORCO) 7.5-325 MG per tablet [95579]      Pharmacy where request should be sent:  [facility]    Does the patient have less than a 3 day supply:  [x] Yes  [] No    Sadie Pearl Rep   12/02/21 14:42 EST

## 2021-12-02 NOTE — TELEPHONE ENCOUNTER
Dr. Macias,    Please see below and advise.  Last HYDROcodone-acetaminophen 7.5-325 refill provided 11.22.2021.    Thank you,    Valentina

## 2021-12-03 RX ORDER — HYDROCODONE BITARTRATE AND ACETAMINOPHEN 7.5; 325 MG/1; MG/1
1 TABLET ORAL EVERY 6 HOURS PRN
Qty: 30 TABLET | Refills: 0 | Status: SHIPPED | OUTPATIENT
Start: 2021-12-03 | End: 2022-01-12

## 2021-12-03 NOTE — TELEPHONE ENCOUNTER
Called patient to let her know medication sent I.  I also relayed she should begin to wean off the pain medication and take OTC pain medications.  She verbalized understanding.

## 2022-01-12 ENCOUNTER — OFFICE VISIT (OUTPATIENT)
Dept: ORTHOPEDIC SURGERY | Facility: CLINIC | Age: 57
End: 2022-01-12

## 2022-01-12 DIAGNOSIS — Z09 SURGERY FOLLOW-UP: Primary | ICD-10-CM

## 2022-01-12 PROCEDURE — 99024 POSTOP FOLLOW-UP VISIT: CPT | Performed by: ORTHOPAEDIC SURGERY

## 2022-02-09 ENCOUNTER — OFFICE VISIT (OUTPATIENT)
Dept: ORTHOPEDIC SURGERY | Facility: CLINIC | Age: 57
End: 2022-02-09

## 2022-02-09 DIAGNOSIS — Z09 SURGERY FOLLOW-UP: Primary | ICD-10-CM

## 2022-02-09 PROCEDURE — 99213 OFFICE O/P EST LOW 20 MIN: CPT | Performed by: ORTHOPAEDIC SURGERY

## 2022-02-09 NOTE — PROGRESS NOTES
Post-op (4 week recheck - status post Left calcaneal osteotomy, 1st metatarsal osteotomy, posterior tibial tendon transfer, peroneus longus tenodesis to brevis, achilles lengthening  21 )      Ciera Patton is 3 months status post complex left foot reconstruction with calcaneal osteotomy, first metatarsal osteotomy, posterior tib tendon transfer, peroneus longus tenodesis, Achilles lengthening, 2021. She reports no fever, chills.  She reports pain is well controlled.  They have been taking aspirin for DVT prophylaxis.  They have been weight bearing in boot.      Past Surgical History:   Procedure Laterality Date   • BACK SURGERY      fusion  L1  1993   L3 2018   • CALCANEAL OSTEOTOMY Left 2021    Procedure: left calcaneal osteotomy, 1st metatarsal osteotomy, posterior tibial tendon transfer, peroneus longus tenodesis to brevis, achilles lengthening;  Surgeon: Keesha Macias MD;  Location: [facility];  Service: Orthopedics;  Laterality: Left;  also open tenotomies on toes 2-5   •  SECTION  1997    Emergency   • COLONOSCOPY         • D & C AND LAPAROSCOPY      2014   • FOOT SURGERY Left 2021    calcaneal osteotomy, 1st metatarsal osteotomy, posterior tibial tendon transfer, peroneus longus tenodesis to brevis, achilles lengthening  - Dr. Macias   • METATARSAL OSTEOTOMY Left 2021    Procedure: METATARSAL OSTEOTOMY;  Surgeon: Keesha Macias MD;  Location:  [facility];  Service: Orthopedics;  Laterality: Left;   • MYRINGOTOMY Right    • WISDOM TOOTH EXTRACTION     • WRIST SURGERY Left     Gangion cyst excision x2       There were no vitals taken for this visit.        Good alignment, no erythema, no drainage, no sign of infection, normal post op swelling left foot, the posterior tibial tendon transfer is working excellently, the foot is straight and plantar grade, she has 2 small scabs that are healing with no infection.      ordered and reviewed x-rays  today    Assessment and Plan:   1. Surgery follow-up  She is doing very well.  The posterior tibial tendon transfer is functioning very nicely.  I cautioned her to avoid pushing plantarflexion to avoid stretching out the transfer.  She may now be fitted for an AFO brace.  When she gets the brace she may transfer from the boot to the brace.  She should not however walk without the boot or the brace.  She also should start PT.  Again they should not push plantarflexion.  I will see her again in 3 months standing 2 views of the foot and standing hindfoot alignment film.  She would like to try working, we will allow her to work 4 hours per day for 4 weeks, then increase as tolerated.  If she has difficulty with too much swelling, she will call back.    - XR Foot 2 View Left          Keesha Degroot MD

## 2022-02-16 ENCOUNTER — TELEPHONE (OUTPATIENT)
Dept: ORTHOPEDIC SURGERY | Facility: CLINIC | Age: 57
End: 2022-02-16

## 2022-02-16 NOTE — TELEPHONE ENCOUNTER
[facility]IS[facility] OUTPATIENT PHYSICAL THERAPY CALLED WITH QUESTIONS ABOUT THE PROTOCOL AND ABOUT ASO. MERISSA WOULD LIKE A CALL BACK [phone].

## 2022-02-16 NOTE — TELEPHONE ENCOUNTER
I spoke with Debby and she wanted to confirm the protocol for the plantarflexion since the patient was under the impression to not do any plantarflexion. I advised that per the order and even the note from 02/09/22, Dr. Macias DOES NOT want to push plantarflexion. She understood. Debby also asked about the AFO brace and if it needed to be custom. I advised that typically we like to have custom AFO braces. She understood.    Joanne

## 2022-05-09 ENCOUNTER — OFFICE VISIT (OUTPATIENT)
Dept: ORTHOPEDIC SURGERY | Facility: CLINIC | Age: 57
End: 2022-05-09

## 2022-05-09 VITALS — WEIGHT: 279.98 LBS | BODY MASS INDEX: 41.47 KG/M2 | HEIGHT: 69 IN

## 2022-05-09 DIAGNOSIS — G62.9 NEUROPATHY: ICD-10-CM

## 2022-05-09 DIAGNOSIS — Z09 SURGERY FOLLOW-UP: Primary | ICD-10-CM

## 2022-05-09 DIAGNOSIS — E66.01 OBESITY, MORBID, BMI 40.0-49.9: ICD-10-CM

## 2022-05-09 PROCEDURE — 99213 OFFICE O/P EST LOW 20 MIN: CPT | Performed by: ORTHOPAEDIC SURGERY

## 2022-05-09 RX ORDER — MONTELUKAST SODIUM 10 MG/1
TABLET ORAL
COMMUNITY
Start: 2022-04-13

## 2022-05-09 NOTE — PROGRESS NOTES
"Follow-up (3 month recheck- 6 months status post Left calcaneal osteotomy, 1st metatarsal osteotomy, posterior tibial tendon transfer, peroneus longus tenodesis to brevis, achilles lengthening  21 )      Ciera Patton is 6 months status post left calcaneal osteotomy, first metatarsal osteotomy, posterior tibial tendon transfer, peroneus longus tenodesis, Achilles lengthening, . She reports no fever, chills.  She reports pain is improving .  They have been taking off meds now for DVT prophylaxis.  They have been weight bearing as tolerated.      Past Surgical History:   Procedure Laterality Date   • BACK SURGERY      fusion  L1     L3 2018   • CALCANEAL OSTEOTOMY Left 2021    Procedure: left calcaneal osteotomy, 1st metatarsal osteotomy, posterior tibial tendon transfer, peroneus longus tenodesis to brevis, achilles lengthening;  Surgeon: Keesha Macias MD;  Location:  [facility];  Service: Orthopedics;  Laterality: Left;  also open tenotomies on toes 2-5   •  SECTION  1997    Emergency   • COLONOSCOPY         • D & C AND LAPAROSCOPY      2014   • FOOT SURGERY Left 2021    calcaneal osteotomy, 1st metatarsal osteotomy, posterior tibial tendon transfer, peroneus longus tenodesis to brevis, achilles lengthening  - Dr. Macias   • METATARSAL OSTEOTOMY Left 2021    Procedure: METATARSAL OSTEOTOMY;  Surgeon: Keseha Macias MD;  Location:  [facility];  Service: Orthopedics;  Laterality: Left;   • MYRINGOTOMY Right    • WISDOM TOOTH EXTRACTION     • WRIST SURGERY Left     Gangion cyst excision x2       Ht 175.3 cm (69.02\")   Wt 127 kg (279 lb 15.8 oz)   BMI 41.33 kg/m²         Good alignment, no erythema, no drainage, no sign of infection, normal post op swelling left foot and ankle, the tendon transfer pulls through well, the foot is plantigrade    ordered and reviewed x-rays today    Assessment and Plan:   1. Surgery follow-up  Significant improvement " following complicated reconstruction.  In her AFO brace she has a very good gait.  She may continue therapy on her own.  I will see her again in 6 months standing 2 views of the foot  - XR Foot 3+ View Left    2. Neuropathy  No change    3. Obesity, morbid, BMI 40.0-49.9 ([facility])  No change          Keesha Degroot MD

## 2022-06-23 NOTE — PROGRESS NOTES
Post-op Follow-up (6 week f/u; 10 weeks status post Left calcaneal osteotomy, 1st metatarsal osteotomy, posterior tibial tendon transfer, peroneus longus tenodesis to brevis, achilles lengthening  21)      Ciera Patton is 10 weeks status post left calcaneal osteotomy, posterior tib transfer, peroneus longus tenodesis, Achilles lengthening, first metatarsal osteotomy, 2021. She reports no fever, chills.  She reports pain is well controlled.  They have been taking aspirin for DVT prophylaxis.  They have been NWB in splint.      Past Surgical History:   Procedure Laterality Date   • BACK SURGERY      fusion  L1  1993   L3 2018   • CALCANEAL OSTEOTOMY Left 2021    Procedure: left calcaneal osteotomy, 1st metatarsal osteotomy, posterior tibial tendon transfer, peroneus longus tenodesis to brevis, achilles lengthening;  Surgeon: Keesha Macias MD;  Location: [facility];  Service: Orthopedics;  Laterality: Left;  also open tenotomies on toes 2-5   •  SECTION  1997    Emergency   • COLONOSCOPY      2019   • D & C AND LAPAROSCOPY      2014   • FOOT SURGERY Left 2021    calcaneal osteotomy, 1st metatarsal osteotomy, posterior tibial tendon transfer, peroneus longus tenodesis to brevis, achilles lengthening  - Dr. Macias   • METATARSAL OSTEOTOMY Left 2021    Procedure: METATARSAL OSTEOTOMY;  Surgeon: Keesha Macias MD;  Location:  [facility];  Service: Orthopedics;  Laterality: Left;   • MYRINGOTOMY Right    • WISDOM TOOTH EXTRACTION     • WRIST SURGERY Left     Gangion cyst excision x2       There were no vitals taken for this visit.        Good alignment, no erythema, no drainage, no sign of infection, normal post op swelling left foot, there is a moderate dorsal medial scab, 2 small staples left on the lateral aspect, surprisingly very good function of the posterior tibial tendon transfer, she has active dorsiflexion in neutral    ordered and reviewed x-rays  today    Assessment and Plan:   1. Surgery follow-up  I want her to avoid soaking the foot until all the scabs are gone, I think it is okay to take a shower.  She was put into a tall boot today.  I want her to wear compression sock under the boot.  She may walk in the boot short distances in the house.  I will see her again in 4 weeks for standing 2 views of the foot to possibly start some physical therapy.  We remove the remaining staples and put a Bactroban dressing on the scab.  - XR Foot 2 View Left          Keesha Degroot MD   No

## (undated) DEVICE — BLANKT WARM UPPR/BDY ARM/OUT 57X196CM

## (undated) DEVICE — SUT MONOCRYL PLS ANTIB UND 3/0  PS1 27IN

## (undated) DEVICE — UNDERCAST PADDING: Brand: DEROYAL

## (undated) DEVICE — PENCL ROCKRSWCH MEGADYNE W/HOLSTR 10FT SS

## (undated) DEVICE — DELFI MATCHING LIMB PROTECTION SLEEVES (MLPS) HELP PROTECT THE PATIENT’S LIMB FROM POSSIBLE WRINKLING, PINCHING AND SHEARING OF SKIN AND SOFT TISSUES OF THE LIMB.EACH DELFI MATCHING LIMB PROTECTION SLEEVE IS INTENDED FOR USE WITH A SPECIFIC DELFI TOURNIQUET CUFF. THIS SLEEVE IS SPECIFICALLY FOR THIGH.: Brand: MATCHING LIMB PROTECTION SLEEVES - VARI-FIT

## (undated) DEVICE — SUT MNCRYL PLS ANTIB UD 4/0 PS2 18IN

## (undated) DEVICE — SUT MNCRYL 2/0 SH 27IN UD MCP417H

## (undated) DEVICE — P28, K-WIRE, 1.6 X 150 MM, SINGLE TROCAR, SMOOTH, SS
Type: IMPLANTABLE DEVICE | Site: FOOT | Status: NON-FUNCTIONAL
Brand: MULTI SYSTEM
Removed: 2021-11-02

## (undated) DEVICE — P28, DRILL, Ø1.6 X 110MM (Ø2.5), SOLID, SS: Brand: BABY GORILLA®/GORILLA® PLATING SYSTEM

## (undated) DEVICE — GLV SURG RAD SENSICARE SHLD PF LF SZ8 STRL

## (undated) DEVICE — ELECTRD BLD EZ CLN STD 2.5IN

## (undated) DEVICE — PROXIMATE RH ROTATING HEAD SKIN STAPLERS (35 WIDE) CONTAINS 35 STAINLESS STEEL STAPLES: Brand: PROXIMATE

## (undated) DEVICE — PK EXTREM LOWR 10

## (undated) DEVICE — DISPOSABLE TOURNIQUET CUFF SINGLE BLADDER, DUAL PORT AND QUICK CONNECT CONNECTOR: Brand: COLOR CUFF

## (undated) DEVICE — BNDG ELAS W/CLIP 6IN 10YD LF STRL

## (undated) DEVICE — BNDG ESMARK 6INX9FT STRL

## (undated) DEVICE — GW THRD TROC/PT 2.8X300MM

## (undated) DEVICE — DRSNG TELFA PAD NONADH STR 1S 3X8IN

## (undated) DEVICE — SUT MNCRYL 2/0 SH 27IN Y417H

## (undated) DEVICE — T-DRAPE,EXTREMITY,STERILE: Brand: MEDLINE

## (undated) DEVICE — SUT ETHLN 3/0 PC5 18IN 1893G

## (undated) DEVICE — INTENDED FOR TISSUE SEPARATION, AND OTHER PROCEDURES THAT REQUIRE A SHARP SURGICAL BLADE TO PUNCTURE OR CUT.: Brand: BARD-PARKER ® SAFETYLOCK CARBON RIB-BACK BLADES

## (undated) DEVICE — PAD ARMBRD SURG CONVOL 7.5X20X2IN

## (undated) DEVICE — SNAP KOVER: Brand: UNBRANDED

## (undated) DEVICE — SPLNT ORTHOGLASS UNPAD P/C 4X24IN

## (undated) DEVICE — SPLNT ORTHOGLASS UNPAD P/C 4X38IN

## (undated) DEVICE — UNDERGLV SURG BIOGEL INDICAT PF 61/2 GRN

## (undated) DEVICE — IMPLANTABLE DEVICE
Type: IMPLANTABLE DEVICE | Site: FOOT | Status: NON-FUNCTIONAL
Removed: 2021-11-02

## (undated) DEVICE — TOWEL,OR,DSP,ST,BLUE,STD,4/PK,20PK/CS: Brand: MEDLINE

## (undated) DEVICE — STCKNT IMPERV 12IN STRL

## (undated) DEVICE — Device

## (undated) DEVICE — SUT MNCRYL 3/0 SH 27IN UD MCP416H

## (undated) DEVICE — GLV SURG SIGNATURE TOUCH PF LTX 7 STRL

## (undated) DEVICE — PUMP PAIN AUTOFUSER AUTO SELCT NOBOLUS 1TO14ML/HR 550ML DISP

## (undated) DEVICE — SPNG GZ WOVN 4X4IN 12PLY 10/BX STRL

## (undated) DEVICE — DRAPE,TOP,102X53,STERILE: Brand: MEDLINE

## (undated) DEVICE — 1010 S-DRAPE TOWEL DRAPE 10/BX: Brand: STERI-DRAPE™

## (undated) DEVICE — PATIENT RETURN ELECTRODE, SINGLE-USE, CONTACT QUALITY MONITORING, ADULT, WITH 9FT CORD, FOR PATIENTS WEIGING OVER 33LBS. (15KG): Brand: MEGADYNE